# Patient Record
Sex: FEMALE | Race: WHITE | NOT HISPANIC OR LATINO | Employment: OTHER | URBAN - METROPOLITAN AREA
[De-identification: names, ages, dates, MRNs, and addresses within clinical notes are randomized per-mention and may not be internally consistent; named-entity substitution may affect disease eponyms.]

---

## 2023-05-19 LAB
CALCIDIOL (25 OH VITAMIN D3) (NG/ML) IN SER/PLAS: 25 NG/ML
COBALAMIN (VITAMIN B12) (PG/ML) IN SER/PLAS: 329 PG/ML (ref 211–911)
FERRITIN (UG/LL) IN SER/PLAS: 219 UG/L (ref 8–150)

## 2023-08-25 LAB
FERRITIN (UG/LL) IN SER/PLAS: 232 UG/L (ref 8–150)
IRON (UG/DL) IN SER/PLAS: 83 UG/DL (ref 35–150)
IRON BINDING CAPACITY (UG/DL) IN SER/PLAS: 316 UG/DL (ref 240–445)
IRON SATURATION (%) IN SER/PLAS: 26 % (ref 25–45)

## 2023-10-04 ENCOUNTER — TREATMENT (OUTPATIENT)
Dept: PHYSICAL THERAPY | Facility: CLINIC | Age: 73
End: 2023-10-04
Payer: MEDICARE

## 2023-10-04 DIAGNOSIS — M25.69 BACK STIFFNESS: ICD-10-CM

## 2023-10-04 DIAGNOSIS — M54.9 CHRONIC LEFT-SIDED BACK PAIN, UNSPECIFIED BACK LOCATION: Primary | ICD-10-CM

## 2023-10-04 DIAGNOSIS — G89.29 CHRONIC LEFT-SIDED BACK PAIN, UNSPECIFIED BACK LOCATION: Primary | ICD-10-CM

## 2023-10-04 PROCEDURE — 97110 THERAPEUTIC EXERCISES: CPT | Mod: GP | Performed by: PHYSICAL THERAPIST

## 2023-10-04 NOTE — PROGRESS NOTES
Physical Therapy Treatment    Patient Name: Ludmila Bermudez  MRN: [id number]  Today's Date: 10/4/2023  Time Calculation  Start Time: 0125  Stop Time: 0225  Time Calculation (min): 60 min    Current Problem  1. Chronic left-sided back pain, unspecified back location        2. Back stiffness            Insurance   Payer: Medicare Part B  Visit Number: 4  Approved Visits: mn  Date Range: cert period 9/12/23-12/12/23  Misc: na  Precautions   PMHx: CA, DVT, ulcer.       Subjective   Pt reports the home program is going well at home. She did have knee pain after practicing the squats at home from a low card chair, but not since she put a cushion on it. She had back pain once at a soccer game over the past week, but otherwise it has been feeling good.        Pain     0/10    Objective     Treatments:  all exercises completed below with verbal cues on technique and SBA  Access Code: [email or web address]  URL: [email or web address]  Date: 10/04/2023  Prepared by: Jinny Rowell    Exercises  - Seated Thoracic Lumbar Extension  - 3 x daily - 7 x weekly - 1 sets - 10-20 reps  - Standing Lumbar Extension with Counter  - 3 x daily - 7 x weekly - 1 sets - 10-20 reps  - Standing Shoulder Row with Anchored Resistance  - 1-2 x daily - 7 x weekly - 1 sets - 10-20 reps  - Shoulder extension with resistance - Neutral  - 1-2 x daily - 7 x weekly - 1 sets - 10-20 reps  - Standing Anti-Rotation Press with Anchored Resistance  - 1-2 x daily - 7 x weekly - 1 sets - 10-20 reps  - Supine Bridge  - 1-2 x daily - 7 x weekly - 1 sets - 10-20 reps - 3-5s hold  - Figure 4 Bridge  - 2 x daily - 7 x weekly - 2 sets - 10-20 reps  - Clamshell with Resistance  - 2 x daily - 7 x weekly - 2 sets - 10-20 reps  - Single Leg Stance with Support  - 2 x daily - 7 x weekly - 2 sets  - Standing March with Unilateral Counter Support  - 2 x daily - 7 x weekly - 2 sets - 10-20 reps  - Squat with Chair Touch  - 2 x daily - 7 x weekly - 2 sets - 10-20  reps  - Quadruped Alternating Leg Extensions  - 2 x daily - 7 x weekly - 2 sets - 10-20 reps  - Bird Dog  - 2 x daily - 7 x weekly - 2 sets - 10-20 reps  - Forward Step Up  - 2 x daily - 7 x weekly - 4 sets - 5 reps    Assessment:     Pt able to progress strength based exercises this date     Plan:     Reassess NV and consider discharge to [facility]    Goals:

## 2023-10-05 ENCOUNTER — APPOINTMENT (OUTPATIENT)
Dept: PHYSICAL THERAPY | Facility: CLINIC | Age: 73
End: 2023-10-05
Payer: MEDICARE

## 2023-10-10 PROBLEM — R22.1 NECK MASS: Status: ACTIVE | Noted: 2023-10-10

## 2023-10-10 PROBLEM — R79.89 OTHER SPECIFIED ABNORMAL FINDINGS OF BLOOD CHEMISTRY: Status: RESOLVED | Noted: 2023-10-10 | Resolved: 2023-10-10

## 2023-10-10 PROBLEM — K25.9 GASTRIC ULCER: Status: ACTIVE | Noted: 2023-10-10

## 2023-10-10 PROBLEM — M25.69 BACK STIFFNESS: Status: RESOLVED | Noted: 2023-10-04 | Resolved: 2023-10-10

## 2023-10-10 PROBLEM — L30.9 ECZEMA: Status: ACTIVE | Noted: 2023-10-10

## 2023-10-10 PROBLEM — E87.1 HYPONATREMIA: Status: ACTIVE | Noted: 2023-10-10

## 2023-10-10 PROBLEM — E55.9 VITAMIN D DEFICIENCY: Status: ACTIVE | Noted: 2023-10-10

## 2023-10-10 PROBLEM — E53.8 VITAMIN B12 DEFICIENCY: Status: ACTIVE | Noted: 2023-10-10

## 2023-10-10 PROBLEM — K92.1 HEMATOCHEZIA: Status: ACTIVE | Noted: 2023-10-10

## 2023-10-10 PROBLEM — G47.9 SLEEP DISTURBANCE: Status: ACTIVE | Noted: 2023-10-10

## 2023-10-10 PROBLEM — R79.89 OTHER SPECIFIED ABNORMAL FINDINGS OF BLOOD CHEMISTRY: Status: ACTIVE | Noted: 2023-10-10

## 2023-10-10 PROBLEM — K63.3 SIGMOID COLON ULCER: Status: ACTIVE | Noted: 2023-10-10

## 2023-10-10 PROBLEM — K11.7 XEROSTOMIA DUE TO RADIOTHERAPY: Status: ACTIVE | Noted: 2023-10-10

## 2023-10-10 PROBLEM — I82.629 DEEP VEIN THROMBOSIS (DVT) OF UPPER EXTREMITY (MULTI): Status: ACTIVE | Noted: 2023-10-10

## 2023-10-10 PROBLEM — C77.0 METASTASIS TO HEAD AND NECK LYMPH NODE (MULTI): Status: ACTIVE | Noted: 2023-10-10

## 2023-10-10 PROBLEM — H61.22 IMPACTED CERUMEN OF LEFT EAR: Status: ACTIVE | Noted: 2023-10-10

## 2023-10-10 PROBLEM — R13.12 DYSPHAGIA, OROPHARYNGEAL PHASE: Status: ACTIVE | Noted: 2023-10-10

## 2023-10-10 PROBLEM — C09.9 MALIGNANT NEOPLASM OF TONSIL (MULTI): Status: ACTIVE | Noted: 2023-10-10

## 2023-10-10 PROBLEM — R79.89 ELEVATED FERRITIN: Status: ACTIVE | Noted: 2023-10-10

## 2023-10-10 PROBLEM — L02.11 CELLULITIS AND ABSCESS OF NECK: Status: ACTIVE | Noted: 2023-10-10

## 2023-10-10 PROBLEM — R06.00 DYSPNEA: Status: ACTIVE | Noted: 2023-10-10

## 2023-10-10 PROBLEM — G57.93 NEUROPATHY OF BOTH FEET: Status: ACTIVE | Noted: 2023-10-10

## 2023-10-10 PROBLEM — E83.42 HYPOMAGNESEMIA: Status: ACTIVE | Noted: 2023-10-10
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
## 2023-10-10 PROBLEM — L03.221 CELLULITIS AND ABSCESS OF NECK: Status: ACTIVE | Noted: 2023-10-10

## 2023-10-10 RX ORDER — ATORVASTATIN CALCIUM TRIHYDRATE 20 MG/1
1 TABLET, FILM COATED ORAL NIGHTLY
COMMUNITY
Start: 2021-11-18

## 2023-10-10 RX ORDER — ACETAMINOPHEN 500 MG
TABLET ORAL DAILY
COMMUNITY

## 2023-10-11 ENCOUNTER — TREATMENT (OUTPATIENT)
Dept: PHYSICAL THERAPY | Facility: CLINIC | Age: 73
End: 2023-10-11
Payer: MEDICARE

## 2023-10-11 DIAGNOSIS — G89.29 CHRONIC BACK PAIN, UNSPECIFIED BACK LOCATION, UNSPECIFIED BACK PAIN LATERALITY: Primary | ICD-10-CM

## 2023-10-11 DIAGNOSIS — M54.9 CHRONIC BACK PAIN, UNSPECIFIED BACK LOCATION, UNSPECIFIED BACK PAIN LATERALITY: Primary | ICD-10-CM

## 2023-10-11 PROCEDURE — 97110 THERAPEUTIC EXERCISES: CPT | Mod: GP | Performed by: PHYSICAL THERAPIST

## 2023-10-11 ASSESSMENT — ENCOUNTER SYMPTOMS
OCCASIONAL FEELINGS OF UNSTEADINESS: 0
LOSS OF SENSATION IN FEET: 1
DEPRESSION: 0

## 2023-10-11 NOTE — PROGRESS NOTES
Physical Therapy Treatment-- Discharge Summary    Patient Name: Ludmila Bermudez  MRN: [id number]  Today's Date: 10/11/2023  Time Calculation  Start Time: 0130  Stop Time: 0212  Time Calculation (min): 42 min    Current Problem  1. Chronic back pain, unspecified back location, unspecified back pain laterality              Insurance   Payer: Medicare Part B  Visit Number: 5  Approved Visits: mn  Date Range: cert period 9/12/23-12/12/23  Misc: na  Precautions   PMHx: CA, DVT, ulcer.       Subjective   Pt reports her back still comes and goes depending on what she is doing. She has not been sleeping well recently which she thinks contributes to her back pain.  She reports she is able to stand and walk longer distances before her back starts to bother her to where she needs to sit.  Pt also reports she is only taking tylenol a few times per week.      Pain   Current pain 0/10  Worst pain 2/10      Objective   5x sit to stand test: 9s  30s sit to stand test: 16.5  SLS: 46s RLE 19s LLE        Treatments:  Bike warm up x 1 mile @ L 2.5  5x sit to stand  30s sit to stand  SLS R/L LE with supervision  Lumbar ROM all planes  Review HEP   all exercises completed below with verbal cues on technique and SBA  Access Code: [email or web address]  URL: [email or web address]  Date: 10/04/2023  Prepared by: Jinny Rowell    Exercises  - Seated Thoracic Lumbar Extension  - 3 x daily - 7 x weekly - 1 sets - 10-20 reps  - Standing Lumbar Extension with Counter  - 3 x daily - 7 x weekly - 1 sets - 10-20 reps  - Standing Shoulder Row with Anchored Resistance  - 1-2 x daily - 7 x weekly - 1 sets - 10-20 reps  - Shoulder extension with resistance - Neutral  - 1-2 x daily - 7 x weekly - 1 sets - 10-20 reps  - Standing Anti-Rotation Press with Anchored Resistance  - 1-2 x daily - 7 x weekly - 1 sets - 10-20 reps  - Supine Bridge  - 1-2 x daily - 7 x weekly - 1 sets - 10-20 reps - 3-5s hold  - Figure 4 Bridge  - 2 x daily - 7 x weekly  - 2 sets - 10-20 reps  - Clamshell with Resistance  - 2 x daily - 7 x weekly - 2 sets - 10-20 reps  - Single Leg Stance with Support  - 2 x daily - 7 x weekly - 2 sets  - Standing March with Unilateral Counter Support  - 2 x daily - 7 x weekly - 2 sets - 10-20 reps  - Squat with Chair Touch  - 2 x daily - 7 x weekly - 2 sets - 10-20 reps  - Quadruped Alternating Leg Extensions  - 2 x daily - 7 x weekly - 2 sets - 10-20 reps  - Bird Dog  - 2 x daily - 7 x weekly - 2 sets - 10-20 reps  - Forward Step Up  - 2 x daily - 7 x weekly - 4 sets - 5 reps    Assessment:     Pt demonstrating improvements in pain, ROM, strength, balance, endurance, and function since start of PT. She is expected to maintain improvements with continued adherence to HEP.      Plan:   Discharge to HEP.    Goals:    Pt will independently comply to HEP without difficulty. -met   Pt will improve score on Oswestry by 12 points to meet MCID. -met  Pt will demonstrate symmetrical ROM without pain or tightness. -met  Pt will stand without c/o fatigue or symptoms to complete chores in kitchen without discomfort for 60 minutes. -met  Pt will amb community distances without increased pain.  -met

## 2023-10-12 ENCOUNTER — APPOINTMENT (OUTPATIENT)
Dept: PHYSICAL THERAPY | Facility: CLINIC | Age: 73
End: 2023-10-12
Payer: MEDICARE

## 2023-10-24 ENCOUNTER — ALLIED HEALTH (OUTPATIENT)
Dept: INTEGRATIVE MEDICINE | Facility: CLINIC | Age: 73
End: 2023-10-24
Payer: MEDICARE

## 2023-10-24 DIAGNOSIS — G89.29 CHRONIC PAIN: ICD-10-CM

## 2023-10-24 DIAGNOSIS — M54.59 OTHER LOW BACK PAIN: Primary | ICD-10-CM

## 2023-10-24 PROCEDURE — 97810 ACUP 1/> WO ESTIM 1ST 15 MIN: CPT | Performed by: ACUPUNCTURIST

## 2023-10-24 PROCEDURE — 97811 ACUP 1/> W/O ESTIM EA ADD 15: CPT | Performed by: ACUPUNCTURIST

## 2023-10-24 ASSESSMENT — ENCOUNTER SYMPTOMS
NECK PAIN: 1
BACK PAIN: 1

## 2023-10-24 NOTE — PROGRESS NOTES
Acupuncture Visit:     Subjective   Patient ID: Ludmila Bermudez is a 73 y.o. female who presents for Back Pain and Neck Pain  Pt reported that she graduated from physical therapy.  Her balance speed agility have improved.  Her back is reported as doing well with occasional fatigue from doing too much.  She and  just returned from trip to [place].  She was able to enjoy with no issue.  Her neck is also good with 1-2 catches since last treatment.  She continues to have neuropathy in her feet    Back Pain    Neck Pain         Session Information  Is this acupuncture treatment being billed to the patient's insurance company: Yes  This is visit number: 3  The patient has a total number of visits of: 8  Name of Insurance Company: Medicare a/b  Name of Supervising Physician: MATHEW Lundberg  Visit Type: Follow-up visit         Review of Systems   Musculoskeletal:  Positive for back pain and neck pain.            Provider reviewed plan for the acupuncture session, precautions and contraindications. Patient/guardian/hospital staff has given consent to treat with full understanding of what to expect during the session. Before acupuncture began, provider explained to the patient to communicate at any time if the procedure was causing discomfort past their tolerance level. Patient agreed to advise acupuncturist. The acupuncturist counseled the patient on the risks of acupuncture treatment including pain, infection, bleeding, and no relief of pain. The patient was positioned comfortably. There was no evidence of infection at the site of needle insertions.    Objective   Physical Exam         Treatment Plan  Treatment Goals: Pain management  Pattern Differentiation: st qi deficiency adrenal imbalance, immune imbalance  Treatment Principle: move qi and blood, regulate adrenals and immune    Acupuncture Treatment  Patient Position: Supine on a table  Acupuncture Needling: Yes  Needle Guage: 36 guage /.20/ Blue sindy, 40 guage  /.16/ Red seirin, 42 guage /.14/ Lime green seirin  Body Points: With retention  Body Points - Left: sj 5, east wind, si 16, gb 21 12  Body Points - Bilateral: baxie, st qix3, k3, sp 3.2  Body Points - Right: gb 41  Other Techniques Utilized: TDP Lamp, Topicals  Topicals Description: warming lotion orange frankinscense  TDP Lamp Descripton: gary 15 min  Needle Count In: 24  Needle Count Out: 24  Needle Retention Time (min): 20 minutes  Total Face to Face Time (min): 25 minutes              Assessment/Plan

## 2023-11-14 ENCOUNTER — ALLIED HEALTH (OUTPATIENT)
Dept: INTEGRATIVE MEDICINE | Facility: CLINIC | Age: 73
End: 2023-11-14
Payer: MEDICARE

## 2023-11-14 DIAGNOSIS — M54.59 OTHER LOW BACK PAIN: Primary | ICD-10-CM

## 2023-11-14 DIAGNOSIS — G89.29 CHRONIC PAIN: ICD-10-CM

## 2023-11-14 PROCEDURE — 97810 ACUP 1/> WO ESTIM 1ST 15 MIN: CPT | Performed by: INTERNAL MEDICINE

## 2023-11-14 PROCEDURE — 97811 ACUP 1/> W/O ESTIM EA ADD 15: CPT | Performed by: INTERNAL MEDICINE

## 2023-11-14 NOTE — PROGRESS NOTES
"Acupuncture Visit:     Subjective   Patient ID: Ludmila Bermudez is a 73 y.o. female who presents for No chief complaint on file.  Pt reported that her back continues to be pain free. She is able to travel and navigate any issues on her own.  Her neck also continues to be doing well.  She has only had the \"catching feeling\"  1x.  Neuropathy is maintaining and she has intermittent pain in her ankle.        Session Information  Is this acupuncture treatment being billed to the patient's insurance company: Yes  This is visit number: 4  The patient has a total number of visits of: 8  Name of Insurance Company: Medicare a/b  Name of Supervising Physician: MATHEW Lundberg  Visit Type: Follow-up visit         Review of Systems         Provider reviewed plan for the acupuncture session, precautions and contraindications. Patient/guardian/hospital staff has given consent to treat with full understanding of what to expect during the session. Before acupuncture began, provider explained to the patient to communicate at any time if the procedure was causing discomfort past their tolerance level. Patient agreed to advise acupuncturist. The acupuncturist counseled the patient on the risks of acupuncture treatment including pain, infection, bleeding, and no relief of pain. The patient was positioned comfortably. There was no evidence of infection at the site of needle insertions.    Objective   Physical Exam         Treatment Plan  Treatment Goals: Pain management    Acupuncture Treatment  Patient Position: Supine on a table  Needle Guage: 42 guage /.14/ Lime green seirin, 40 guage /.16/ Red seirin, 36 guage /.20/ Blue seirin  Body Points: With retention  Body Points - Left: sj 16, gb 12, 20, si 16, gb 21  Body Points - Bilateral: st qix1, k 7, 10, bafeng, sp 3.2  Other Techniques Utilized: TDP Lamp, Topicals  Topicals Description: warming lotion orange frankinscense  TDP Lamp Descripton: gary 15 min  Needle Count In: 21  Needle Count " Out: 21  Needle Retention Time (min): 20 minutes  Total Face to Face Time (min): 25 minutes              Assessment/Plan

## 2023-12-01 NOTE — PROGRESS NOTES
Patient ID: Ludmila Bermudez is a 73 y.o. female.  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 Sabino Ragsdale  MedOnc: Dr. Kyunghee Burkitt, X, Tsah Gonzalez ([place])  RadOnc: Dr. Akhil Raphael    Prior Therapy  6/8 - 7/27/21 Concurrent chemoradiation w/ 7 doses of Cisplatin (40mg/m2),  VMAT 70 gy in 35 fx     ONCOLOGIC HISTORY   - 2/2021: Started to have bilateral ear aches. She was treated and eventually ended up with ear aches only on the left side. Her throat continues to bother her, eventually developed a left-sided neck mass. Further evaluation showed a left tonsillar lesion.  - 4/30/2021: CT Neck showed bilateral neck metastasis especially on the left side (~6 cm)  - 5/2021: Biopsy of left tonsillar lesion showed p16+ SCC  - 6/8 - 7/27/21: Concurrent chemoradiation with weekly cisplatin (chemo held on 7/7 due to low platelet, but received total 7 treatments of weekly cisplatin-made up chemo was given on the last date of radiation)  - 10/28/21 Post treatment PET/CT showed very good response, but possible residual metabolic activity in Left level  3 and 4 nodes  - 1/4/22 CT Neck w/ contrast to f/u Left level 3 and 4 nodes, showed subtle irregularity of a left level 2 lymph node raising suspicion for neoplastic involvement.   - 1/31/22 FNA of Level 2 lymph node was non-diagnostic d/t insufficient cellularity  - 3/7/22 Underwent Excisional biopsy of Left level 2 node with Dr. Ragsdale. Path showing Extensive necrosis with fibro-inflammatory and xanthomatous reaction. No definite viable tumor identified.       Treatment complicated by:  - 7/31/21 Admitted due to worsening mucositis and poor oral intake. Prolonged hospitalization due to thrombocytopenia (most  likely ITP) and hematochezia. Thrombocytopenia is likely infection related vs meds side effects. She received Dexa 40mg 4/4 (8/8 - 8/11), two sessions  of IVIG ( - ), IVIG 0.4 mg/kg daily with platelets to follow (- ), s/p 2 doses of Nplate (  and )    - 21: DVT on right axillary vein  - GI bleed: seen by GI, likely secondary to low platelet count.  Per GI, low concern for UGIB.  - 21 Colonoscopy: biopsy of descending colon polyp showed tubular adenoma  -  - 21: Readmitted due to DVT in  LLE.  Started on Lovenox, but stopped due to platelets dropped. Work up for heparin induced thrombocytopenia is negative.  Started on Xarelto on 9/10/21 evening with close follow up with hematology.  - 21: Platelet 61. Taking Xarelto 15mg BID. Per hematology, decrease Xarelto to 15mg daily if platelet is below     PmHx: hx of left tonsil SCC, anxiety, depression, HLD  PsHx: radial head implant ()  FmHx: mother (colorectal cancer at age 67,  at age 94)  ScHx: lives with family, non-smoker, occasional alcohol use, retired  Allergies: Penicillins    Past Medical History:   No past medical history on file.   Surgical History:    Past Surgical History:   Procedure Laterality Date    OTHER SURGICAL HISTORY  2021    Elbow fracture repair      Family History:    Family History   Problem Relation Name Age of Onset    Rectal cancer Mother      Other (appendix disease) Father       Family Oncology History:    Cancer-related family history includes Rectal cancer in her mother.  Social History:            Subjective   Chief Complaint: Squamous Cell Carcinoma of left tonsil, follow up    HPI  Interval History  Ludmila Bermudez is a 73 y.o. female with h/o p16+ SCC of left tonsil with bilateral neck disease s/p concurrent chemoradiation   - 21 with 7 doses of weekly cisplatin, 70gy RT.       Patient presents today accompanied by her  for 2 year 4  months post treatment follow up, reports the following:     She is feeling well over all.   She has mild chronic dysphagia caused by dry mouth.   She has some tightness in her neck.    She is not sleeping well due to achiness and dry mouth at night.  She went to PT in June for back pain as she has multilevel degenerative change in the L spine.   She has sensation of pins & needles in the left foot that is bothersome to her. Only taking Gabapentin 100mg at bedtime.  She's having acupuncture (Xena Duncan) for saliva, neck stiff, back pain, and neuropathy in her feet. She's seeing nice results from acupuncture.   Her left ear fullness resolved with acupuncture as well.   She's following a dentist Q6 mo.  Patient asked about elevated ferritin on prior labs with regards to hemochromatosis. Given her post menopausal status and ferritin <300, and no s/s that would indicate hemochromatosis like fatigue, generalized pain.       ROS  Review of Systems   Constitutional:  Negative for chills and fever.   HENT:   Negative for hearing loss, lump/mass, mouth sores, nosebleeds, tinnitus and trouble swallowing.         Dry mouth   Respiratory:  Negative for cough and shortness of breath.    Cardiovascular:  Negative for chest pain and leg swelling.   Gastrointestinal:  Negative for abdominal pain, constipation, diarrhea, nausea and vomiting.   Musculoskeletal:  Positive for back pain and neck stiffness.   Skin:  Negative for rash.   Neurological:  Negative for headaches, light-headedness and numbness.        Left foot neuropathy       Allergies  Allergies   Allergen Reactions    Sulfamethoxazole Unknown    Penicillins Rash        Medications  Current Outpatient Medications   Medication Instructions    acetaminophen (TYLENOL ORAL) oral    cholecalciferol (Vitamin D3) 5,000 Units tablet oral, Daily    cyanocobalamin, vitamin B-12, (VITAMIN B-12 ORAL) 1,000 mcg, oral    Lipitor 20 mg tablet 1 tablet, oral, Nightly    vit C/E/Zn/coppr/lutein/zeaxan (PRESERVISION AREDS-2 ORAL) oral, 2 times daily          Objective   VS: There were no vitals taken for this visit.  Weight: Daily Weight  05/19/23 : 76.9 kg (169  lb 7 oz)  02/17/23 : 74.4 kg (164 lb)  02/16/23 : 74.6 kg (164 lb 7.4 oz)  11/15/22 : 75.3 kg (166 lb)  08/16/22 : 74.6 kg (164 lb 6 oz)  08/16/22 : 74.5 kg (164 lb 3.9 oz)  05/17/22 : 74 kg (163 lb 3.2 oz)      Physical Exam  Constitutional:       Appearance: Normal appearance. She is well-developed.   HENT:      Head: Normocephalic and atraumatic.      Right Ear: External ear normal. No tenderness.      Left Ear: External ear normal. No tenderness.      Nose: Nose normal.      Mouth/Throat:      Mouth: No injury or oral lesions.      Tongue: No lesions.   Eyes:      Extraocular Movements: Extraocular movements intact.      Conjunctiva/sclera: Conjunctivae normal.      Pupils: Pupils are equal, round, and reactive to light.   Neck:      Thyroid: No thyroid mass.   Cardiovascular:      Rate and Rhythm: Normal rate and regular rhythm.      Pulses: Normal pulses.      Heart sounds: No murmur heard.     No gallop.   Pulmonary:      Effort: Pulmonary effort is normal. No respiratory distress.      Breath sounds: Normal breath sounds. No stridor. No wheezing.   Abdominal:      General: Bowel sounds are normal. There is no distension or abdominal bruit.      Palpations: Abdomen is soft.      Tenderness: There is no abdominal tenderness.   Musculoskeletal:         General: Normal range of motion.      Cervical back: Normal range of motion and neck supple. No signs of trauma. Normal range of motion.   Lymphadenopathy:      Upper Body:      Right upper body: No axillary adenopathy.      Left upper body: No axillary adenopathy.   Skin:     General: Skin is warm and dry.      Comments: No new skin lesions   Neurological:      General: No focal deficit present.      Mental Status: She is alert and oriented to person, place, and time.      Gait: Gait is intact.   Psychiatric:         Mood and Affect: Mood and affect normal.         Behavior: Behavior is cooperative.         Diagnostic Results   Labs  Below labs are reviewed  "today.                         Images       Pathology  Lab Results   Component Value Date    PATHREP  03/07/2022     Name OK GREENWOOD                                                                                                   Accession #: [id number]            Pathologist:                   WADAD S. MNEIMNEH, MD  Date of Procedure:    3/7/2022  Date Received:          3/7/2022  Date Reported           3/8/2022  Submitting Physician:   FARNAZ FINK MD  Location:                    OR  Other External #                                                                    FINAL DIAGNOSIS  \"LEFT LEVEL 2 NODE\", RESECTION:  -- EXTENSIVE NECROSIS WITH FIBROINFLAMMATORY AND XANTHOMATOUS REACTION (SEE  NOTE).  -- NO DEFINITE VIABLE TUMOR IDENTIFIED.    Note: The tissue is entirely submitted for examination. The nodule consists of  necrosis with fibroinflammatory and xanthomatous reaction and cholesterol  clefts. Within the necrosis, there is occasional markedly degenerated  keratinous material. These findings most likely represent a previously replaced  lymph node by neoplasm with extensive/complete treatment response.  No viable  tumor is identified.                                                                                                                                                                                                                                                                                                                                                                                                                                                                                       Electronically Signed Out By WADAD S. MNEIMNEH, MD/WSM  By the signature on this report, the individual or group listed as making the  Final Interpretation/Diagnosis certifies that they have reviewed this case.         Intraoperative Consultation:  A: LEFT LEVEL 2 NODE ( RULE OUT CANCER)    Frozen " Section 1:  Date Ordered: 3/7/2022 13:04     Date Received: 3/7/2022 13:04     Date Called:  3/7/2022 13:27    Intraoperative Diagnosis:  A.  Negative for malignancy.  B.  Necrotizing granuloma.  Intraoperative Consult Pathologist(s):  HIREN REESE MD (P)      aaa/3/7/2022           Clinical History:  tonsillar cancer, status post chemoradiation      Specimens Submitted As:  A: LEFT LEVEL 2 NODE ( RULE OUT CANCER)     Gross Description:  Received fresh for intraoperative consultation, labeled with the patient's name  and hospital number, is a 2.5 x 1.5 x 0.8 cm possible lymph node with attached  adipose tissue. The lymph node is serially sectioned. The entire lymph node was  submitted for intraoperative diagnosis and the tissue remnants in 2 cassettes,  A1 FSC-A2 FSC. The  remaining fibroadipose tissue is submitted for permanent in  one cassette, A3. AAA/RL    aaa/3/7/2022               [facility]  Department of Pathology   [address] [place] [address]        PATHREP  01/31/2022                                                     MRN: [id number]  Patient Name OK GREENWOOD  Accession #: [id number]  Date of Procedure:  1/31/2022       Date Reported: 2/2/2022  Date Received:  1/31/2022  Date of Birth / Sex 1950 (Age: 71) / F  Race: WHITE  Submitting Physician: FARNAZ FINK MD  Attending Physician: NABIL SINGH MD           Other External #          FINAL CYTOLOGICAL INTERPRETATION    A.  FINE NEEDLE ASPIRATION LEFT NECK LYMPH NODE, CYTOLOGY AND CELL BLOCK:       NONDIAGNOSTIC SPECIMEN DUE TO INSUFFICIENT CELLULARITY.        Interpretation performed at:  [facility]  Department of Pathology  [address] [place] [address]  Phone: [phone] Fax: [phone]      Slide(s) initially screened by a Cytotechnologist at [facility], [address] [place] [address]  [id number]      Electronically Signed Out By COLTON VASQUEZ MD    By the signature on this report, the individual or group listed as making the  Final Interpretation/Diagnosis certifies that they have reviewed this case.  Slide(s) initially screened by a Cytotechnologist at [facility]     Clinical History      Physician Contact Number: [phone]  Clinical Diagnosis History ENLARGED LYMPH NODE   Source of Specimen  A: FINE NEEDLE ASPIRATION LEFT NECK LYMPH NODE     Specimen Submitted as:  A:  FINE NEEDLE ASPIRATION LEFT NECK LYMPH NODE        Pap stain Non-Gyn, Pap stain Non-Gyn, Pap stain Non-Gyn, Pap stain  Non-Gyn, Diff-Quik stain Non-Gyn, Diff-Quik stain Non-Gyn, Diff-Quik stain  Non-Gyn, Diff-Quik stain Non-Gyn, CELL BLOCK, H&E, Initial    Gross Description  A.  FINE NEEDLE ASPIRATION LEFT NECK LYMPH NODE:  RECEIVED 8 DIRECT SMEARS (4  AIR-DRIED AND 4 ALCOHOL-FIXED) AND 30 cc OF PINK TINTED, CLEAR NEEDLE RINSE IN  CYTOLYT WITH FEW PARTICLES.                 [facility]  Department of Pathology  [address] [place] [address]             Assessment/Plan   ASSESSMENT  Ludmila Bermudez is a 73 y.o. female with hx of p16+ SCC of left tonsil with bilateral neck disease s/p concurrent chemoradiation with weekly cispl atin  6/8/ - 7/27/21, received 7 cycles of Cisplatin, 70 gy RT. Restaging PET/CT 10/28/21 showed very good response with possible residual metabolic activity in Level 3 & 4 nodes. Excisional biopsy 3/7/22 did not review evidence of metastatic disease.  --------------------  Today VSS, labs WNL, TSH wnl. Weight stable     # Left Tonsil SCC,  p16+  - 10/28/21 Post treatment PET/CT showed very good response with possible residual metabolic activity in Level 3 & 4 nodes.  - CT Neck 1/4/21 showed suspicious left cervical lymph node at level 2. FNA of left level 2 lymph node was biopsied on 1/31/22 however not enough tissues were obtained. Open biopsy on  3/7/22 with Dr. Ragsdale showed no malignancy.  - Had covid in January 2023 but is largely resolved, has a mild lingering cough.   - Patient is 2 year 4 month from last treatment, doing well overall. 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 Advise patient try Biotene gel and xylimelt for dry mouth atnight.      # Low Back Pain  - 1.5 h/o LBP, onset after long road trips. Back pain occurs with muscle spasm and radiation to muscle of lumbar region. Finds some relief when leaning forward on grocery carts  - Could be arthritis vs spinal stenosis, given h/o cancer could also be mets.   - Patient scheduled to start PT on 6/1  - MRI Lumbar/Sacrum did not show any neoplasm related change, though does note narrowing of multilevel degenerative changes of the lumbar spine.  - Patient      # Left side of tongue pain: improved  - Lesion noted on left side of tongue, patient has had pain there since starting CRT. Previously thought it was d/t irritation from adjacent silver cavity filling, but pain persists after filling was  changed to plastic. Likely SE from radiation      # Xerostomia/dysgeusia  - Improving with acupuncture notes saliva production returning, seeing acupuncturist Q2wks     # Eczema    - Red scaly rash on low back, belly, nape of neck, saw Derm, dx with Eczema and was prescribed topical steroids. Pt is not always compliant with this and she scratches at the rash. Advised patient to  follow derm recs     # Neuropathy, left foot  - Patient with ongoing left foot neuropathy causing her to wake up at night. Only taking Gabapentin 100mg at bedtime, which likely is not effective.   - Increase to 300mg at bedtime.    # Elevated Ferritin  - No family history of hemachromatosis. She is post menopausal and ferritin is <300, TSAT 26% (NOT >45%). Pt without s/s that would indicate hemochromatosis I.e fatigue, generalized pain. Liver enzymes WNL, no s/s of iron overload. As Ferritin is elevated but TSAT is normal, more  likely the elevated ferritin is due to chronic inflammatory state.       PLAN  -- RTC in 4 mo with ENT/MedOnc on 4/26/24  -- START Gabapentin 300mg at bedtime for left foot neuropathy  -- Can try biotene oral gel and Xylimelt for Xerostomia   -- Continue follow up with PCP/dentist/dermatologist/ PCP/ Integrative health/ Acupuncture

## 2023-12-06 ENCOUNTER — TELEMEDICINE (OUTPATIENT)
Dept: INTEGRATIVE MEDICINE | Facility: CLINIC | Age: 73
End: 2023-12-06
Payer: MEDICARE

## 2023-12-06 DIAGNOSIS — K11.7 XEROSTOMIA DUE TO RADIOTHERAPY: ICD-10-CM

## 2023-12-06 DIAGNOSIS — M54.59 OTHER LOW BACK PAIN: ICD-10-CM

## 2023-12-06 DIAGNOSIS — G47.9 SLEEP DISTURBANCE: ICD-10-CM
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
## 2023-12-06 DIAGNOSIS — E53.8 VITAMIN B12 DEFICIENCY: Primary | ICD-10-CM

## 2023-12-06 PROCEDURE — 99215 OFFICE O/P EST HI 40 MIN: CPT | Performed by: INTERNAL MEDICINE

## 2023-12-06 ASSESSMENT — ENCOUNTER SYMPTOMS
BACK PAIN: 1
FATIGUE: 1
SLEEP DISTURBANCE: 1

## 2023-12-06 NOTE — PROGRESS NOTES
Integrative Medicine Follow-up Visit :     Subjective   Patient ID: Ludmila Bermudez is a 73 y.o. female who presents for Dry Mouth   Used doximitry connection because patient could not figure out how to stay logged onto the connection through epic.     HPI  Went to PT and found that back pain has improved almost completely, 90% improved.  She feels PT helped her balance. She has continued the home exercises somewhat  Acupuncture- improved her back pain.    Vitamin b12 728 pg/ml  42.9 was vitamin d on September 13.  Has upcoming hematology appointment at end of December. She forgot to ask hematology about elevated ferritin.  Difficulty sleeping: she does not nap.  Cannot stay asleep.  Her mouth gets very dry when she sleeps. She has very dry mouth when she sleeps.        Pain: mostly with dry mouth.     Review of Systems   Constitutional:  Positive for fatigue.   HENT:          Dry mouth which is painful.    Musculoskeletal:  Positive for back pain.   Psychiatric/Behavioral:  Positive for sleep disturbance.                   Objective   There were no vitals taken for this visit.    Physical Exam  Constitutional:       General: She is not in acute distress.     Appearance: She is not ill-appearing, toxic-appearing or diaphoretic.   Pulmonary:      Effort: Pulmonary effort is normal. No respiratory distress.   Musculoskeletal:         General: No deformity.      Cervical back: Normal range of motion.      Comments: Upper extremities normal range of motion grossly   Skin:     Coloration: Skin is not jaundiced or pale.      Findings: No rash.   Psychiatric:         Mood and Affect: Mood normal.         Behavior: Behavior normal.                      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

## 2023-12-07 NOTE — ASSESSMENT & PLAN NOTE
Improved markedly. Continue acupuncture and encouraged patient to do the home PT exercises to keep her back limber and pain free.

## 2023-12-07 NOTE — ASSESSMENT & PLAN NOTE
Keep mouth closed during sleep to see if this helps- determine if she is mouth breather. Ask ENT about any other meds to try.

## 2023-12-07 NOTE — ASSESSMENT & PLAN NOTE
>>ASSESSMENT AND PLAN FOR OTHER LOW BACK PAIN WRITTEN ON 12/6/2023 11:17 PM BY GABINO WASSERMAN MD PHD    Improved markedly. Continue acupuncture and encouraged patient to do the home PT exercises to keep her back limber and pain free.

## 2023-12-14 ENCOUNTER — ALLIED HEALTH (OUTPATIENT)
Dept: INTEGRATIVE MEDICINE | Facility: CLINIC | Age: 73
End: 2023-12-14
Payer: MEDICARE

## 2023-12-14 DIAGNOSIS — R68.2 DRY MOUTH: ICD-10-CM

## 2023-12-14 DIAGNOSIS — M54.59 OTHER LOW BACK PAIN: Primary | ICD-10-CM

## 2023-12-14 DIAGNOSIS — G57.93 NEUROPATHY OF BOTH FEET: ICD-10-CM

## 2023-12-14 PROCEDURE — 97811 ACUP 1/> W/O ESTIM EA ADD 15: CPT | Performed by: INTERNAL MEDICINE

## 2023-12-14 PROCEDURE — 97810 ACUP 1/> WO ESTIM 1ST 15 MIN: CPT | Performed by: INTERNAL MEDICINE

## 2023-12-14 ASSESSMENT — ENCOUNTER SYMPTOMS: BACK PAIN: 1

## 2023-12-14 NOTE — PROGRESS NOTES
"Acupuncture Visit:     Subjective   Patient ID: Ludmila Bermudez is a 73 y.o. female who presents for Back Pain  Pt with .  She shared that her back has been good, with exception of 1 day when she felt it got tired.  It was a day that was rainy and cold with a lot of walking and traveling.  Her feet are ok.  She feels like her left foot and ankle are problematic.  She takes 1 gabapentin,   Previous:  Pt reported that her back continues to be pain free. She is able to travel and navigate any issues on her own.  Her neck also continues to be doing well.  She has only had the \"catching feeling\"  1x.  Neuropathy is maintaining and she has intermittent pain in her ankle and feels like she has a too tight shoe on.      Her dry mouth occurs at night.      Back Pain      Session Information  Is this acupuncture treatment being billed to the patient's insurance company: Yes  This is visit number: 5  The patient has a total number of visits of: 8  Name of Insurance Company: Medicare a/b  Name of Supervising Physician: MATHEW Lundberg  Visit Type: Follow-up visit         Review of Systems   Musculoskeletal:  Positive for back pain.            Provider reviewed plan for the acupuncture session, precautions and contraindications. Patient/guardian/hospital staff has given consent to treat with full understanding of what to expect during the session. Before acupuncture began, provider explained to the patient to communicate at any time if the procedure was causing discomfort past their tolerance level. Patient agreed to advise acupuncturist. The acupuncturist counseled the patient on the risks of acupuncture treatment including pain, infection, bleeding, and no relief of pain. The patient was positioned comfortably. There was no evidence of infection at the site of needle insertions.    Objective   Physical Exam         Treatment Plan  Treatment Goals: Pain management  Pattern Differentiation: st qi deficiency, oketsu adrenal " imbalance  Treatment Principle: move qi and blood, regulate adrenals    Acupuncture Treatment  Body Points - Left: cv 12, gb 40, ana luisa 5, lr 4  Body Points - Bilateral: st qix3, k 7, 10, 27,  Other Techniques Utilized: TDP Lamp, Topicals  Topicals Description: warming lotion orange frankinscense  TDP Lamp Descripton: gary 15 min  Needle Count In: 16  Needle Count Out: 16  Needle Retention Time (min): 25 minutes  Total Face to Face Time (min): 25 minutes              Assessment/Plan

## 2023-12-21 NOTE — PROGRESS NOTES
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 She received a combination of chemotherapy and radiation. I cannot appreciate any recurrent tumor.      Minimal dysphagia.    Impacted cerumen in the left ear which was addressed with a small instrument.     I will see her in 4 months.      Chief Complaint     Follow-up regarding the management of a left tonsillar cancer.      History of Present Illness    This lady was seen in May 2021 at the request of a local colleague. Back in February 2020 when she started complaining of bilateral earaches. She was treated and eventually ended up with earaches only on the left side. Her throat continues to bother her. She eventually developed a left-sided neck mass. This led to further evaluation which showed a left tonsillar lesion. This was biopsied and consistent with a p16 positive squamous cell carcinoma. She also had a CT scan of her neck that shows bilateral neck metastasis especially on the left side. She received a combination of chemotherapy and radiation. She finished on July 27, 2021. She really has had all sorts of issues during her treatments and ended up being in the hospital for 27 days. She had a PET scan done on October 28, 2021. I personally reviewed that scan that shows an uptake in the left a level 2. On January 4, 2022 she had a CT scan that I personally reviewed that shows there is a 1 cm node in the left level 2 that was described as suspicious by the radiologist. She was sent for an ultrasound-guided needle aspirate. Unfortunately this was not diagnostic. She was eventually brought to the operating room on March 7, 2022. The lymph node was removed. It did not show any cancer. She does have some limitation in swallowing because of dryness. She denies any pain or discomfort. She had a chest x-ray in May 2023 that was negative. She  had a TSH level in August 2023 which was also normal.     Physical Exam      Examination of the oral cavity and oropharynx shows evidence of dryness but no evidence of any suspicious lesions. There is good mobility of the tongue and palate. There is good mandibular excursion. Palpation of the parotid, neck, and thyroid field is negative for any masses or adenopathies.     A flexible laryngoscopy was carried out. Under topical Xylocaine and Moiz-Synephrine the scope was introduced through the nostril. The nasopharynx, base of tongue, hypopharynx, and larynx are visualized. The vocal cords are normally mobile. There is a fair amount of puffiness. There is some pooling of secretions.      The ear examination shows some impacted cerumen on the left.  This was addressed with small instruments.

## 2023-12-22 ENCOUNTER — OFFICE VISIT (OUTPATIENT)
Dept: OTOLARYNGOLOGY | Facility: HOSPITAL | Age: 73
End: 2023-12-22
Payer: MEDICARE

## 2023-12-22 ENCOUNTER — LAB (OUTPATIENT)
Dept: LAB | Facility: HOSPITAL | Age: 73
End: 2023-12-22
Payer: MEDICARE

## 2023-12-22 ENCOUNTER — OFFICE VISIT (OUTPATIENT)
Dept: HEMATOLOGY/ONCOLOGY | Facility: HOSPITAL | Age: 73
End: 2023-12-22
Payer: MEDICARE

## 2023-12-22 VITALS — BODY MASS INDEX: 29.19 KG/M2 | TEMPERATURE: 96.1 F | WEIGHT: 171 LBS | HEIGHT: 64 IN

## 2023-12-22 VITALS
HEIGHT: 63 IN | OXYGEN SATURATION: 96 % | WEIGHT: 170 LBS | HEART RATE: 74 BPM | BODY MASS INDEX: 30.12 KG/M2 | RESPIRATION RATE: 17 BRPM | TEMPERATURE: 96.6 F | SYSTOLIC BLOOD PRESSURE: 148 MMHG | DIASTOLIC BLOOD PRESSURE: 67 MMHG

## 2023-12-22 DIAGNOSIS — C10.9 MALIGNANT NEOPLASM OF OROPHARYNX (MULTI): ICD-10-CM

## 2023-12-22 DIAGNOSIS — E03.2 HYPOTHYROIDISM DUE TO MEDICATION: ICD-10-CM

## 2023-12-22 DIAGNOSIS — R13.12 DYSPHAGIA, OROPHARYNGEAL PHASE: Primary | ICD-10-CM

## 2023-12-22 DIAGNOSIS — C09.9 MALIGNANT NEOPLASM OF TONSIL (MULTI): Primary | ICD-10-CM

## 2023-12-22 DIAGNOSIS — C09.9 MALIGNANT NEOPLASM OF TONSIL (MULTI): ICD-10-CM

## 2023-12-22 DIAGNOSIS — K11.7 XEROSTOMIA DUE TO RADIOTHERAPY: ICD-10-CM

## 2023-12-22 DIAGNOSIS — G57.92 NEUROPATHY OF LEFT FOOT: ICD-10-CM

## 2023-12-22 DIAGNOSIS — Z08 ENCOUNTER FOR FOLLOW-UP SURVEILLANCE OF HEAD AND NECK CANCER: ICD-10-CM

## 2023-12-22 DIAGNOSIS — H61.22 IMPACTED CERUMEN OF LEFT EAR: ICD-10-CM
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
## 2023-12-22 DIAGNOSIS — Z85.89 ENCOUNTER FOR FOLLOW-UP SURVEILLANCE OF HEAD AND NECK CANCER: ICD-10-CM

## 2023-12-22 DIAGNOSIS — R79.89 ELEVATED FERRITIN: ICD-10-CM

## 2023-12-22 LAB
ALBUMIN SERPL BCP-MCNC: 4.3 G/DL (ref 3.4–5)
ALP SERPL-CCNC: 104 U/L (ref 33–136)
ALT SERPL W P-5'-P-CCNC: 18 U/L (ref 7–45)
ANION GAP SERPL CALC-SCNC: 12 MMOL/L (ref 10–20)
AST SERPL W P-5'-P-CCNC: 19 U/L (ref 9–39)
BASOPHILS # BLD AUTO: 0.02 X10*3/UL (ref 0–0.1)
BASOPHILS NFR BLD AUTO: 0.4 %
BILIRUB SERPL-MCNC: 0.5 MG/DL (ref 0–1.2)
BUN SERPL-MCNC: 19 MG/DL (ref 6–23)
CALCIUM SERPL-MCNC: 9.4 MG/DL (ref 8.6–10.3)
CHLORIDE SERPL-SCNC: 103 MMOL/L (ref 98–107)
CO2 SERPL-SCNC: 29 MMOL/L (ref 21–32)
CREAT SERPL-MCNC: 0.79 MG/DL (ref 0.5–1.05)
EOSINOPHIL # BLD AUTO: 0.08 X10*3/UL (ref 0–0.4)
EOSINOPHIL NFR BLD AUTO: 1.7 %
ERYTHROCYTE [DISTWIDTH] IN BLOOD BY AUTOMATED COUNT: 11.8 % (ref 11.5–14.5)
GFR SERPL CREATININE-BSD FRML MDRD: 80 ML/MIN/1.73M*2
GLUCOSE SERPL-MCNC: 102 MG/DL (ref 74–99)
HCT VFR BLD AUTO: 39.4 % (ref 36–46)
HGB BLD-MCNC: 12.8 G/DL (ref 12–16)
IMM GRANULOCYTES # BLD AUTO: 0.01 X10*3/UL (ref 0–0.5)
IMM GRANULOCYTES NFR BLD AUTO: 0.2 % (ref 0–0.9)
LYMPHOCYTES # BLD AUTO: 1.38 X10*3/UL (ref 0.8–3)
LYMPHOCYTES NFR BLD AUTO: 29.6 %
MCH RBC QN AUTO: 32.4 PG (ref 26–34)
MCHC RBC AUTO-ENTMCNC: 32.5 G/DL (ref 32–36)
MCV RBC AUTO: 100 FL (ref 80–100)
MONOCYTES # BLD AUTO: 0.58 X10*3/UL (ref 0.05–0.8)
MONOCYTES NFR BLD AUTO: 12.4 %
NEUTROPHILS # BLD AUTO: 2.59 X10*3/UL (ref 1.6–5.5)
NEUTROPHILS NFR BLD AUTO: 55.7 %
NRBC BLD-RTO: 0 /100 WBCS (ref 0–0)
PLATELET # BLD AUTO: 173 X10*3/UL (ref 150–450)
POTASSIUM SERPL-SCNC: 4.1 MMOL/L (ref 3.5–5.3)
PROT SERPL-MCNC: 6.8 G/DL (ref 6.4–8.2)
RBC # BLD AUTO: 3.95 X10*6/UL (ref 4–5.2)
SODIUM SERPL-SCNC: 140 MMOL/L (ref 136–145)
WBC # BLD AUTO: 4.7 X10*3/UL (ref 4.4–11.3)

## 2023-12-22 PROCEDURE — 85025 COMPLETE CBC W/AUTO DIFF WBC: CPT

## 2023-12-22 PROCEDURE — 69210 REMOVE IMPACTED EAR WAX UNI: CPT | Performed by: OTOLARYNGOLOGY

## 2023-12-22 PROCEDURE — 99213 OFFICE O/P EST LOW 20 MIN: CPT | Performed by: OTOLARYNGOLOGY

## 2023-12-22 PROCEDURE — 31575 DIAGNOSTIC LARYNGOSCOPY: CPT | Performed by: OTOLARYNGOLOGY

## 2023-12-22 PROCEDURE — 1036F TOBACCO NON-USER: CPT | Performed by: STUDENT IN AN ORGANIZED HEALTH CARE EDUCATION/TRAINING PROGRAM

## 2023-12-22 PROCEDURE — 1036F TOBACCO NON-USER: CPT | Performed by: OTOLARYNGOLOGY

## 2023-12-22 PROCEDURE — 99215 OFFICE O/P EST HI 40 MIN: CPT | Performed by: STUDENT IN AN ORGANIZED HEALTH CARE EDUCATION/TRAINING PROGRAM

## 2023-12-22 PROCEDURE — 1126F AMNT PAIN NOTED NONE PRSNT: CPT | Performed by: STUDENT IN AN ORGANIZED HEALTH CARE EDUCATION/TRAINING PROGRAM

## 2023-12-22 PROCEDURE — 1159F MED LIST DOCD IN RCRD: CPT | Performed by: OTOLARYNGOLOGY

## 2023-12-22 PROCEDURE — 1160F RVW MEDS BY RX/DR IN RCRD: CPT | Performed by: OTOLARYNGOLOGY

## 2023-12-22 PROCEDURE — 36415 COLL VENOUS BLD VENIPUNCTURE: CPT

## 2023-12-22 PROCEDURE — 1159F MED LIST DOCD IN RCRD: CPT | Performed by: STUDENT IN AN ORGANIZED HEALTH CARE EDUCATION/TRAINING PROGRAM

## 2023-12-22 PROCEDURE — 1160F RVW MEDS BY RX/DR IN RCRD: CPT | Performed by: STUDENT IN AN ORGANIZED HEALTH CARE EDUCATION/TRAINING PROGRAM

## 2023-12-22 PROCEDURE — 80053 COMPREHEN METABOLIC PANEL: CPT

## 2023-12-22 PROCEDURE — 1126F AMNT PAIN NOTED NONE PRSNT: CPT | Performed by: OTOLARYNGOLOGY

## 2023-12-22 ASSESSMENT — ENCOUNTER SYMPTOMS
CONSTIPATION: 0
ABDOMINAL PAIN: 0
FEVER: 0
TROUBLE SWALLOWING: 0
SHORTNESS OF BREATH: 0
DIARRHEA: 0
NECK STIFFNESS: 1
LEG SWELLING: 0
VOMITING: 0
CHILLS: 0
HEADACHES: 0
OCCASIONAL FEELINGS OF UNSTEADINESS: 0
DEPRESSION: 0
LOSS OF SENSATION IN FEET: 1
COUGH: 0
NAUSEA: 0
NUMBNESS: 0
LIGHT-HEADEDNESS: 0
BACK PAIN: 1

## 2023-12-22 ASSESSMENT — PATIENT HEALTH QUESTIONNAIRE - PHQ9
SUM OF ALL RESPONSES TO PHQ9 QUESTIONS 1 & 2: 0
2. FEELING DOWN, DEPRESSED OR HOPELESS: NOT AT ALL
1. LITTLE INTEREST OR PLEASURE IN DOING THINGS: NOT AT ALL

## 2023-12-22 ASSESSMENT — PAIN SCALES - GENERAL: PAINLEVEL: 0-NO PAIN

## 2024-01-25 ENCOUNTER — ALLIED HEALTH (OUTPATIENT)
Dept: INTEGRATIVE MEDICINE | Facility: CLINIC | Age: 74
End: 2024-01-25
Payer: MEDICARE

## 2024-01-25 DIAGNOSIS — M54.59 OTHER LOW BACK PAIN: Primary | ICD-10-CM

## 2024-01-25 PROCEDURE — 97811 ACUP 1/> W/O ESTIM EA ADD 15: CPT | Performed by: INTERNAL MEDICINE

## 2024-01-25 PROCEDURE — 97810 ACUP 1/> WO ESTIM 1ST 15 MIN: CPT | Performed by: INTERNAL MEDICINE

## 2024-01-25 ASSESSMENT — ENCOUNTER SYMPTOMS: BACK PAIN: 1

## 2024-01-25 NOTE — PROGRESS NOTES
Acupuncture Visit:     Subjective   Patient ID: Ludmila Bermudez is a 73 y.o. female who presents for Back Pain  Pt with .  She shared that she has been getting cramps in feet and lower legs.  It resolved by drinking pickle juice.  Her back has been pretty good.  Her neck has caught 1-2x only.  She is waking up less due to mouth dryness.      Previous:  Pt with .  She shared that her back has been good, with exception of 1 day when she felt it got tired.  It was a day that was rainy and cold with a lot of walking and traveling.  Her feet are ok.  She feels like her left foot and ankle are problematic.  She takes 1 gabapentin,     Back Pain        Session Information  Is this acupuncture treatment being billed to the patient's insurance company: Yes  This is visit number: 6  The patient has a total number of visits of: 8  Name of Insurance Company: Medicare a/b  Name of Supervising Physician: MATHEW Lundberg  Visit Type: Follow-up visit         Review of Systems   Musculoskeletal:  Positive for back pain.            Provider reviewed plan for the acupuncture session, precautions and contraindications. Patient/guardian/hospital staff has given consent to treat with full understanding of what to expect during the session. Before acupuncture began, provider explained to the patient to communicate at any time if the procedure was causing discomfort past their tolerance level. Patient agreed to advise acupuncturist. The acupuncturist counseled the patient on the risks of acupuncture treatment including pain, infection, bleeding, and no relief of pain. The patient was positioned comfortably. There was no evidence of infection at the site of needle insertions.    Objective   Physical Exam         Treatment Plan  Treatment Goals: Pain management  Pattern Differentiation: oketsu, sugar imbalance  Treatment Principle: regulate sugar, move qi and blood    Acupuncture Treatment  Patient Position: Supine on a  table  Acupuncture Needling: Yes  Needle Guage: 40 guage /.16/ Red seirin, 32 guage /.25/ Purple seirin  Body Points: With retention  Body Points - Left: gb 20, si 16  Body Points - Bilateral: lr3, li 4, st 25, cv 2.5 12  Other Techniques Utilized: TDP Lamp, Topicals  Topicals Description: warming lotion orange frankinscense  TDP Lamp Descripton: gary 15 min  Needle Count In: 12  Needle Count Out: 12  Needle Retention Time (min): 25 minutes  Total Face to Face Time (min): 25 minutes              Assessment/Plan

## 2024-03-07 ENCOUNTER — ALLIED HEALTH (OUTPATIENT)
Dept: INTEGRATIVE MEDICINE | Facility: CLINIC | Age: 74
End: 2024-03-07
Payer: MEDICARE

## 2024-03-07 DIAGNOSIS — M54.59 OTHER LOW BACK PAIN: Primary | ICD-10-CM

## 2024-03-07 PROCEDURE — 97811 ACUP 1/> W/O ESTIM EA ADD 15: CPT | Performed by: INTERNAL MEDICINE

## 2024-03-07 PROCEDURE — 97810 ACUP 1/> WO ESTIM 1ST 15 MIN: CPT | Performed by: INTERNAL MEDICINE

## 2024-03-07 NOTE — PROGRESS NOTES
Acupuncture Visit:     Subjective   Patient ID: Ludmila Bermudez is a 74 y.o. female who presents for No chief complaint on file.  Pt with .  Pt reported that she has intermittent back pain.  The pain is triggered by long car rides and bending forward in yard to  sticks.        Session Information  Is this acupuncture treatment being billed to the patient's insurance company: Yes  This is visit number: 7  The patient has a total number of visits of: 8  Name of Insurance Company: Medicare a/b  Name of Supervising Physician: MATHEW Lundberg  Visit Type: Follow-up visit         Review of Systems         Provider reviewed plan for the acupuncture session, precautions and contraindications. Patient/guardian/hospital staff has given consent to treat with full understanding of what to expect during the session. Before acupuncture began, provider explained to the patient to communicate at any time if the procedure was causing discomfort past their tolerance level. Patient agreed to advise acupuncturist. The acupuncturist counseled the patient on the risks of acupuncture treatment including pain, infection, bleeding, and no relief of pain. The patient was positioned comfortably. There was no evidence of infection at the site of needle insertions.    Objective   Physical Exam                             Assessment/Plan

## 2024-04-11 ENCOUNTER — ALLIED HEALTH (OUTPATIENT)
Dept: INTEGRATIVE MEDICINE | Facility: CLINIC | Age: 74
End: 2024-04-11
Payer: MEDICARE

## 2024-04-11 ENCOUNTER — APPOINTMENT (OUTPATIENT)
Dept: INTEGRATIVE MEDICINE | Facility: CLINIC | Age: 74
End: 2024-04-11
Payer: MEDICARE

## 2024-04-11 DIAGNOSIS — M54.59 OTHER LOW BACK PAIN: Primary | ICD-10-CM

## 2024-04-11 PROCEDURE — 97810 ACUP 1/> WO ESTIM 1ST 15 MIN: CPT | Performed by: INTERNAL MEDICINE

## 2024-04-11 PROCEDURE — 97811 ACUP 1/> W/O ESTIM EA ADD 15: CPT | Performed by: INTERNAL MEDICINE

## 2024-04-11 ASSESSMENT — ENCOUNTER SYMPTOMS: BACK PAIN: 1

## 2024-04-11 NOTE — PROGRESS NOTES
Acupuncture Visit:     Subjective   Patient ID: Ludmila Bermudez is a 74 y.o. female who presents for Back Pain  PT with .  She has intermittent back pain.  She only experiences pain when she over tired.  Her sleep is poor and unaffected by medication.  Her neck is a little swollen behind ear with intermittent catching in her neck.    Previous:  Pt with .  Pt reported that she has intermittent back pain.  The pain is triggered by long car rides and bending forward in yard to  sticks.      Back Pain      Session Information  Is this acupuncture treatment being billed to the patient's insurance company: Yes  This is visit number: 8  The patient has a total number of visits of: 8  Name of Insurance Company: Medicare a/b  Name of Supervising Physician: MATHEW Lundberg  Visit Type: Follow-up visit         Review of Systems   Musculoskeletal:  Positive for back pain.            Provider reviewed plan for the acupuncture session, precautions and contraindications. Patient/guardian/hospital staff has given consent to treat with full understanding of what to expect during the session. Before acupuncture began, provider explained to the patient to communicate at any time if the procedure was causing discomfort past their tolerance level. Patient agreed to advise acupuncturist. The acupuncturist counseled the patient on the risks of acupuncture treatment including pain, infection, bleeding, and no relief of pain. The patient was positioned comfortably. There was no evidence of infection at the site of needle insertions.    Objective   Physical Exam         Treatment Plan  Treatment Goals: Pain management  Pattern Differentiation: immune and adrenal imbalance  Treatment Principle: regulate adrenals and immune    Acupuncture Treatment  Patient Position: Lateral recumbent on a table  Needle Guage: 36 guage /.20/ Blue irin  Body Points - Left: gb 39, east wind, si 16, gb 21, st 9  Body Points - Bilateral: ub 22,  23, 24  Other Techniques Utilized: TDP Lamp, Topicals  Topicals Description: warming lotion orange frankinscense  TDP Lamp Descripton: victoriano men  Needle Count In: 11  Needle Count Out: 11  Needle Retention Time (min): 30 minutes  Total Face to Face Time (min): 30 minutes              Assessment/Plan

## 2024-04-15 ASSESSMENT — ENCOUNTER SYMPTOMS
FEVER: 0
BACK PAIN: 1
NECK STIFFNESS: 1
LEG SWELLING: 0
CHILLS: 0
VOMITING: 0
NAUSEA: 0
ABDOMINAL PAIN: 0
COUGH: 0
HEADACHES: 0
CONSTIPATION: 0
SHORTNESS OF BREATH: 0
DIARRHEA: 0
TROUBLE SWALLOWING: 0
LIGHT-HEADEDNESS: 0

## 2024-04-15 NOTE — PROGRESS NOTES
Patient ID: Ludmila Bermudez is a 74 y.o. female.  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 Sabino Ragsdale  MedOnc: Dr. Kyunghee Burkitt, X, Tash Gonzalez ([place])  RadOnc: Dr. Akhil Raphael    Prior Therapy  6/8 - 7/27/21 Concurrent chemoradiation w/ 7 doses of Cisplatin (40mg/m2),  VMAT 70 gy in 35 fx     ONCOLOGIC HISTORY   - 2/2021: Started to have bilateral ear aches. She was treated and eventually ended up with ear aches only on the left side. Her throat continues to bother her, eventually developed a left-sided neck mass. Further evaluation showed a left tonsillar lesion.  - 4/30/2021: CT Neck showed bilateral neck metastasis especially on the left side (~6 cm)  - 5/2021: Biopsy of left tonsillar lesion showed p16+ SCC  - 6/8 - 7/27/21: Concurrent chemoradiation with weekly cisplatin (chemo held on 7/7 due to low platelet, but received total 7 treatments of weekly cisplatin-made up chemo was given on the last date of radiation)  - 10/28/21 Post treatment PET/CT showed very good response, but possible residual metabolic activity in Left level  3 and 4 nodes  - 1/4/22 CT Neck w/ contrast to f/u Left level 3 and 4 nodes, showed subtle irregularity of a left level 2 lymph node raising suspicion for neoplastic involvement.   - 1/31/22 FNA of Level 2 lymph node was non-diagnostic d/t insufficient cellularity  - 3/7/22 Underwent Excisional biopsy of Left level 2 node with Dr. Ragsdale. Path showing Extensive necrosis with fibro-inflammatory and xanthomatous reaction. No definite viable tumor identified.       Treatment complicated by:  - 7/31/21 Admitted due to worsening mucositis and poor oral intake. Prolonged hospitalization due to thrombocytopenia (most  likely ITP) and hematochezia. Thrombocytopenia is likely infection related vs meds side effects. She received Dexa 40mg 4/4 (8/8 - 8/11), two sessions  of IVIG ( - ), IVIG 0.4 mg/kg daily with platelets to follow (- ), s/p 2 doses of Nplate (  and )    - 21: DVT on right axillary vein  - GI bleed: seen by GI, likely secondary to low platelet count.  Per GI, low concern for UGIB.  - 21 Colonoscopy: biopsy of descending colon polyp showed tubular adenoma  -  - 21: Readmitted due to DVT in  LLE.  Started on Lovenox, but stopped due to platelets dropped. Work up for heparin induced thrombocytopenia is negative.  Started on Xarelto on 9/10/21 evening with close follow up with hematology.  - 21: Platelet 61. Taking Xarelto 15mg BID. Per hematology, decrease Xarelto to 15mg daily if platelet is below     PmHx: hx of left tonsil SCC, anxiety, depression, HLD  PsHx: radial head implant ()  FmHx: mother (colorectal cancer at age 67,  at age 94)  ScHx: lives with family, non-smoker, occasional alcohol use, retired  Allergies: Penicillins    Past Medical History:   No past medical history on file.   Surgical History:    Past Surgical History:   Procedure Laterality Date    OTHER SURGICAL HISTORY  2021    Elbow fracture repair      Family History:    Family History   Problem Relation Name Age of Onset    Rectal cancer Mother      Other (appendix disease) Father       Family Oncology History:    Cancer-related family history includes Rectal cancer in her mother.  Social History:    Social History     Tobacco Use    Smoking status: Never    Smokeless tobacco: Never          Subjective   Chief Complaint: Squamous Cell Carcinoma of left tonsil, follow up    HPI  Interval History  Ludmila Bermudez is a 74 y.o. female with h/o p16+ SCC of left tonsil with bilateral neck disease s/p concurrent chemoradiation   - 21 with 7 doses of weekly cisplatin, 70gy RT.       Patient presents today accompanied by her  for 3 year post treatment follow up, reports the following:     She continue to feel very well over all. Energy  is great. Her and her  have been driving to many states to watch their grandson's [employer] games.   She has mild chronic dysphagia caused by dry mouth.   Has some tightness in her neck and mild swelling. She has not had any lymphedema therapy.  She went to PT in June 2023 for back pain as she has multilevel degenerative change in the L spine. She's having acupuncture (Xena Duncan) for saliva, neck stiff, back pain, and neuropathy in her feet.   She is not sleeping well due to dry mouth at night and neuropathy in her feet.  She has sensation of pins & needles in the left foot that is bothersome to her. Only taking Gabapentin 300mg at bedtime. We can increased this to 300mg TID.   She's following a dentist Q6 mo.    ROS  Review of Systems   Constitutional:  Negative for chills and fever.   HENT:   Negative for hearing loss, lump/mass, mouth sores, nosebleeds, tinnitus and trouble swallowing.         Dry mouth   Respiratory:  Negative for cough and shortness of breath.    Cardiovascular:  Negative for chest pain and leg swelling.   Gastrointestinal:  Negative for abdominal pain, constipation, diarrhea, nausea and vomiting.   Musculoskeletal:  Positive for back pain and neck stiffness.   Skin:  Negative for rash.   Neurological:  Positive for numbness (Perpheral neuropathy in hands and feet). Negative for headaches and light-headedness.       Allergies  Allergies   Allergen Reactions    Penicillins Rash    Sulfamethoxazole Unknown     Feeling of numbness        Medications  Current Outpatient Medications   Medication Instructions    acetaminophen (TYLENOL ORAL) oral    cholecalciferol (Vitamin D3) 5,000 Units tablet oral, Daily    cyanocobalamin, vitamin B-12, (VITAMIN B-12 ORAL) 1,000 mcg, oral    gabapentin (NEURONTIN) 300 mg, oral, 3 times daily    Lipitor 20 mg tablet 1 tablet, oral, Nightly    vit C/E/Zn/coppr/lutein/zeaxan (PRESERVISION AREDS-2 ORAL) oral, 2 times daily          Objective   VS: BP  152/54 (BP Location: Left arm, Patient Position: Sitting, BP Cuff Size: Large adult)   Pulse 71   Temp 36.3 °C (97.3 °F)   Resp 17   Wt 78.1 kg (172 lb 3.2 oz)   SpO2 99%   BMI 29.56 kg/m²   Weight: Daily Weight  04/26/24 : 78.1 kg (172 lb 3.2 oz)  04/26/24 : 77.2 kg (170 lb 4.8 oz)  12/22/23 : 77.1 kg (170 lb)  12/22/23 : 77.6 kg (171 lb)  05/19/23 : 76.9 kg (169 lb 7 oz)  02/17/23 : 74.4 kg (164 lb)  02/16/23 : 74.6 kg (164 lb 7.4 oz)      Physical Exam  Constitutional:       Appearance: Normal appearance. She is well-developed.   HENT:      Head: Normocephalic and atraumatic.      Right Ear: External ear normal. No tenderness.      Left Ear: External ear normal. No tenderness.      Nose: Nose normal.      Mouth/Throat:      Mouth: No injury or oral lesions.      Tongue: No lesions.   Eyes:      Extraocular Movements: Extraocular movements intact.      Conjunctiva/sclera: Conjunctivae normal.      Pupils: Pupils are equal, round, and reactive to light.   Neck:      Thyroid: No thyroid mass.   Cardiovascular:      Rate and Rhythm: Normal rate and regular rhythm.      Pulses: Normal pulses.      Heart sounds: No murmur heard.     No gallop.   Pulmonary:      Effort: Pulmonary effort is normal. No respiratory distress.      Breath sounds: Normal breath sounds. No stridor. No wheezing.   Abdominal:      General: Bowel sounds are normal. There is no distension or abdominal bruit.      Palpations: Abdomen is soft.      Tenderness: There is no abdominal tenderness.   Musculoskeletal:         General: Normal range of motion.      Cervical back: Normal range of motion and neck supple. No signs of trauma. Normal range of motion.   Lymphadenopathy:      Upper Body:      Right upper body: No axillary adenopathy.      Left upper body: No axillary adenopathy.   Skin:     General: Skin is warm and dry.      Comments: No new skin lesions   Neurological:      General: No focal deficit present.      Mental Status: She is  "alert and oriented to person, place, and time.      Gait: Gait is intact.   Psychiatric:         Mood and Affect: Mood and affect normal.         Behavior: Behavior is cooperative.         Diagnostic Results   Labs  Below labs are reviewed today.  Results from last 7 days   Lab Units 04/26/24  0932   WBC AUTO x10*3/uL 4.5   HEMOGLOBIN g/dL 13.1   HEMATOCRIT % 40.5   PLATELETS AUTO x10*3/uL 168   NEUTROS ABS x10*3/uL 2.50   LYMPHS ABS AUTO x10*3/uL 1.35   MONOS ABS AUTO x10*3/uL 0.52   EOS ABS AUTO x10*3/uL 0.06   NEUTROS PCT AUTO % 56.3   LYMPHS PCT AUTO % 30.3   MONOS PCT AUTO % 11.7   EOS PCT AUTO % 1.3      Results from last 7 days   Lab Units 04/26/24  0932   GLUCOSE mg/dL 99   SODIUM mmol/L 141   POTASSIUM mmol/L 4.2   CHLORIDE mmol/L 103   CO2 mmol/L 30   BUN mg/dL 21   CREATININE mg/dL 0.74   EGFR mL/min/1.73m*2 86   CALCIUM mg/dL 9.8   ALBUMIN g/dL 4.6   PROTEIN TOTAL g/dL 6.8   BILIRUBIN TOTAL mg/dL 0.5   ALK PHOS U/L 104   ALT U/L 21   AST U/L 19     Results from last 7 days   Lab Units 04/26/24  0932   TSH mIU/L 3.16               Images       Pathology  Lab Results   Component Value Date    PATHREP  03/07/2022     Name OK GREENWOOD                                                                                                   Accession #: [id number]            Pathologist:                   WADAD S. MNEIMNEH, MD  Date of Procedure:    3/7/2022  Date Received:          3/7/2022  Date Reported           3/8/2022  Submitting Physician:   FARNAZ FINK MD  Location:                    OR  Other External #                                                                    FINAL DIAGNOSIS  \"LEFT LEVEL 2 NODE\", RESECTION:  -- EXTENSIVE NECROSIS WITH FIBROINFLAMMATORY AND XANTHOMATOUS REACTION (SEE  NOTE).  -- NO DEFINITE VIABLE TUMOR IDENTIFIED.    Note: The tissue is entirely submitted for examination. The nodule consists of  necrosis with fibroinflammatory and xanthomatous reaction and " cholesterol  clefts. Within the necrosis, there is occasional markedly degenerated  keratinous material. These findings most likely represent a previously replaced  lymph node by neoplasm with extensive/complete treatment response.  No viable  tumor is identified.                                                                                                                                                                                                                                                                                                                                                                                                                                                                                       Electronically Signed Out By WADAD S. MNEIMNEH, MD/WSTERE  By the signature on this report, the individual or group listed as making the  Final Interpretation/Diagnosis certifies that they have reviewed this case.         Intraoperative Consultation:  A: LEFT LEVEL 2 NODE ( RULE OUT CANCER)    Frozen Section 1:  Date Ordered: 3/7/2022 13:04     Date Received: 3/7/2022 13:04     Date Called:  3/7/2022 13:27    Intraoperative Diagnosis:  A.  Negative for malignancy.  B.  Necrotizing granuloma.  Intraoperative Consult Pathologist(s):  HIREN REESE MD (P)      aaa/3/7/2022           Clinical History:  tonsillar cancer, status post chemoradiation      Specimens Submitted As:  A: LEFT LEVEL 2 NODE ( RULE OUT CANCER)     Gross Description:  Received fresh for intraoperative consultation, labeled with the patient's name  and hospital number, is a 2.5 x 1.5 x 0.8 cm possible lymph node with attached  adipose tissue. The lymph node is serially sectioned. The entire lymph node was  submitted for intraoperative diagnosis and the tissue remnants in 2 cassettes,  A1 FSC-A2 FSC. The  remaining fibroadipose tissue is submitted for permanent in  one cassette, A3. AAA/RL    aaa/3/7/2022                [facility]  Department of Pathology   [id number] [place] [address]        PATHREP  01/31/2022                                                     MRN: [id number]  Patient Name OK GREENWOOD  Accession #: [id number]  Date of Procedure:  1/31/2022       Date Reported: 2/2/2022  Date Received:  1/31/2022  Date of Birth / Sex 1950 (Age: 71) / F  Race: WHITE  Submitting Physician: AFRNAZ FINK MD  Attending Physician: NABIL SINGH MD           Other External #          FINAL CYTOLOGICAL INTERPRETATION    A.  FINE NEEDLE ASPIRATION LEFT NECK LYMPH NODE, CYTOLOGY AND CELL BLOCK:       NONDIAGNOSTIC SPECIMEN DUE TO INSUFFICIENT CELLULARITY.        Interpretation performed at:  [facility]  Department of Pathology  [address] [place] [address]  Phone: [phone] Fax: [phone]      Slide(s) initially screened by a Cytotechnologist at [facility], [address] [place] [address]      Electronically Signed Out By COLTON VASQUEZ MD    By the signature on this report, the individual or group listed as making the  Final Interpretation/Diagnosis certifies that they have reviewed this case.  Slide(s) initially screened by a Cytotechnologist at [facility]     Clinical History      Physician Contact Number: [phone]  Clinical Diagnosis History ENLARGED LYMPH NODE   Source of Specimen  A: FINE NEEDLE ASPIRATION LEFT NECK LYMPH NODE     Specimen Submitted as:  A:  FINE NEEDLE ASPIRATION LEFT NECK LYMPH NODE        Pap stain Non-Gyn, Pap stain Non-Gyn, Pap stain Non-Gyn, Pap stain  Non-Gyn, Diff-Quik stain Non-Gyn, Diff-Quik stain Non-Gyn, Diff-Quik stain  Non-Gyn, Diff-Quik stain Non-Gyn, CELL BLOCK, H&E, Initial    Gross Description  A.  FINE NEEDLE ASPIRATION LEFT NECK LYMPH NODE:  RECEIVED 8 DIRECT SMEARS (4  AIR-DRIED AND 4 ALCOHOL-FIXED) AND  "30 cc OF PINK TINTED, CLEAR NEEDLE RINSE IN  CYTOLYT WITH FEW PARTICLES.                 [facility]  Department of Pathology  [address] [place] [address]             Assessment/Plan   ASSESSMENT  Ludmila Bermudez is a 74 y.o. female with hx of p16+ SCC of left tonsil with bilateral neck disease s/p concurrent chemoradiation with weekly cispl atin  6/8/ - 7/27/21, received 7 cycles of Cisplatin, 70 gy RT. Restaging PET/CT 10/28/21 showed very good response with possible residual metabolic activity in Level 3 & 4 nodes. Excisional biopsy 3/7/22 did not review evidence of metastatic disease.     # Left Tonsil SCC,  p16+  - 10/28/21 Post treatment PET/CT showed very good response with possible residual metabolic activity in Level 3 & 4 nodes.  - CT Neck 1/4/21 showed suspicious left cervical lymph node at level 2. FNA of left level 2 lymph node was biopsied on 1/31/22 however not enough tissues were obtained. Open biopsy on 3/7/22 with Dr. Ragsdale showed no malignancy.  - Had covid in January 2023 but is largely resolved, has a mild lingering cough.   - Patient is 2 year 4 month from last treatment, doing well overall. 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 Advise patient try Biotene gel and xylimelt for dry mouth atnight.   - 4/26/24: Patient continue to do very well 3 years from treatment. Chronic SE from chemoRT including Xerostomia, mild dysphagia are stable/improving.    # Peripheral Neuropathy  - Patient with ongoing neuropathy in b/l hands and feet. Worst in the left foot and is causing her to wake up at night. Only taking Gabapentin 300mg at bedtime, but is still symptomatic  - Will increase to Gabapentin 300mg TID. Encourage her to engage Acupuncture to help with peripheral neuropathy as well.    # Lymphedema  - Patient with muscle stiffness and some swelling in the neck. Left neck \"catches\" sometimes. Likely 2ry to scar tissue and lymphedema from " radiation. Could benefit from lymphedema therapy.     # Low Back Pain  - 1.5 h/o LBP, onset after long road trips. Back pain occurs with muscle spasm and radiation to muscle of lumbar region. Finds some relief when leaning forward on grocery carts  - Could be arthritis vs spinal stenosis, given h/o cancer could also be mets.   - Patient scheduled to start PT on 6/1  - MRI Lumbar/Sacrum did not show any neoplasm related change, though does note narrowing of multilevel degenerative changes of the lumbar spine.  - Patient completed PT and continues with acupuncture for LBP which has been very helpful     # Left side of tongue pain: improved  - Lesion noted on left side of tongue, patient has had pain there since starting CRT. Previously thought it was d/t irritation from adjacent silver cavity filling, but pain persists after filling was  changed to plastic. Likely SE from radiation      # Xerostomia/dysgeusia  - Improving with acupuncture notes saliva production returning, seeing acupuncturist Q2wks  - Encourage patient to try Xylimelt again or Biotene oral balance gel to see if it can provide more lasting dry mouth relief at night.     # Eczema    - Red scaly rash on low back, belly, nape of neck, saw Derm, dx with Eczema and was prescribed topical steroids. Pt is not always compliant with this and she scratches at the rash. Advised patient to  follow derm recs     # Elevated Ferritin  - No family history of hemachromatosis. She is post menopausal and ferritin is <300, TSAT 26% (NOT >45%). Pt without s/s that would indicate hemochromatosis I.e fatigue, generalized pain. Liver enzymes WNL, no s/s of iron overload. As Ferritin is elevated but TSAT is normal, more likely the elevated ferritin is due to chronic inflammatory state.       PLAN  -- RTC in 6 mo with ENT/MedOnc on 10/15/24  -- START Gabapentin 300mg TID for left foot neuropathy. Start with Gabapentin 300mg BID for 1 week, if tolerating will increase to TID. 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

## 2024-04-25 NOTE — PROGRESS NOTES
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 She received a combination of chemotherapy and radiation. I cannot appreciate any recurrent tumor.  Chest x-ray will be obtained today.     Minimal dysphagia.    Impacted cerumen in both ears which was addressed with a small instrument.     I will see her in 6 months.      Chief Complaint     Follow-up regarding the management of a left tonsillar cancer.      History of Present Illness    This lady was seen in May 2021 at the request of a local colleague. Back in February 2020 when she started complaining of bilateral earaches. She was treated and eventually ended up with earaches only on the left side. Her throat continues to bother her. She eventually developed a left-sided neck mass. This led to further evaluation which showed a left tonsillar lesion. This was biopsied and consistent with a p16 positive squamous cell carcinoma. She also had a CT scan of her neck that shows bilateral neck metastasis especially on the left side. She received a combination of chemotherapy and radiation. She finished on July 27, 2021. She really has had all sorts of issues during her treatments and ended up being in the hospital for 27 days. She had a PET scan done on October 28, 2021. I personally reviewed that scan that shows an uptake in the left a level 2. On January 4, 2022 she had a CT scan that I personally reviewed that shows there is a 1 cm node in the left level 2 that was described as suspicious by the radiologist. She was sent for an ultrasound-guided needle aspirate. Unfortunately this was not diagnostic. She was eventually brought to the operating room on March 7, 2022. The lymph node was removed. It did not show any cancer. She does have some limitation in swallowing because of dryness. She denies any pain or discomfort. She had a chest x-ray in  May 2023 that was negative. She had a TSH level in April 2024 which was also normal.     Physical Exam       Examination of the oral cavity and oropharynx shows evidence of dryness but no evidence of any suspicious lesions. There is good mobility of the tongue and palate. There is good mandibular excursion. Palpation of the parotid, neck, and thyroid field is negative for any masses or adenopathies.  Her left neck is somewhat indurated.     A flexible laryngoscopy was carried out. Under topical Xylocaine and Moiz-Synephrine the scope was introduced through the nostril. The nasopharynx, base of tongue, hypopharynx, and larynx are visualized. The vocal cords are normally mobile. There is a fair amount of puffiness. There is some pooling of secretions.      The ear examination shows some impacted cerumen on both sides.  This was addressed with small instruments.

## 2024-04-26 ENCOUNTER — OFFICE VISIT (OUTPATIENT)
Dept: OTOLARYNGOLOGY | Facility: HOSPITAL | Age: 74
End: 2024-04-26
Payer: MEDICARE

## 2024-04-26 ENCOUNTER — HOSPITAL ENCOUNTER (OUTPATIENT)
Dept: RADIOLOGY | Facility: HOSPITAL | Age: 74
Discharge: HOME | End: 2024-04-26
Payer: MEDICARE

## 2024-04-26 ENCOUNTER — LAB (OUTPATIENT)
Dept: LAB | Facility: HOSPITAL | Age: 74
End: 2024-04-26
Payer: MEDICARE

## 2024-04-26 ENCOUNTER — OFFICE VISIT (OUTPATIENT)
Dept: HEMATOLOGY/ONCOLOGY | Facility: HOSPITAL | Age: 74
End: 2024-04-26
Payer: MEDICARE

## 2024-04-26 VITALS
TEMPERATURE: 97.3 F | WEIGHT: 172.2 LBS | RESPIRATION RATE: 17 BRPM | HEART RATE: 71 BPM | OXYGEN SATURATION: 99 % | BODY MASS INDEX: 29.56 KG/M2 | SYSTOLIC BLOOD PRESSURE: 152 MMHG | DIASTOLIC BLOOD PRESSURE: 54 MMHG

## 2024-04-26 VITALS — BODY MASS INDEX: 29.08 KG/M2 | WEIGHT: 170.3 LBS | HEIGHT: 64 IN | TEMPERATURE: 97.3 F

## 2024-04-26 DIAGNOSIS — T45.1X5A PERIPHERAL NEUROPATHY DUE TO CHEMOTHERAPY (MULTI): ICD-10-CM
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
## 2024-04-26 DIAGNOSIS — C09.9 MALIGNANT NEOPLASM OF TONSIL (MULTI): Primary | ICD-10-CM
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
## 2024-04-26 DIAGNOSIS — Z08 ENCOUNTER FOR FOLLOW-UP SURVEILLANCE OF HEAD AND NECK CANCER: ICD-10-CM

## 2024-04-26 DIAGNOSIS — C77.0 METASTASIS TO HEAD AND NECK LYMPH NODE (MULTI): ICD-10-CM

## 2024-04-26 DIAGNOSIS — E03.2 HYPOTHYROIDISM DUE TO MEDICATION: ICD-10-CM

## 2024-04-26 DIAGNOSIS — K11.7 XEROSTOMIA DUE TO RADIOTHERAPY: ICD-10-CM

## 2024-04-26 DIAGNOSIS — I89.0 LYMPHEDEMA DUE TO AND NOT CONCURRENT WITH IONIZING RADIOTHERAPY: ICD-10-CM

## 2024-04-26 DIAGNOSIS — C09.9 MALIGNANT NEOPLASM OF TONSIL (MULTI): ICD-10-CM

## 2024-04-26 DIAGNOSIS — G62.0 PERIPHERAL NEUROPATHY DUE TO CHEMOTHERAPY (MULTI): ICD-10-CM

## 2024-04-26 DIAGNOSIS — H61.23 BILATERAL IMPACTED CERUMEN: Primary | ICD-10-CM

## 2024-04-26 DIAGNOSIS — Z85.89 ENCOUNTER FOR FOLLOW-UP SURVEILLANCE OF HEAD AND NECK CANCER: ICD-10-CM

## 2024-04-26 PROBLEM — L03.221 CELLULITIS AND ABSCESS OF NECK: Status: RESOLVED | Noted: 2023-10-10 | Resolved: 2024-04-26

## 2024-04-26 PROBLEM — E83.42 HYPOMAGNESEMIA: Status: RESOLVED | Noted: 2023-10-10 | Resolved: 2024-04-26

## 2024-04-26 PROBLEM — K92.1 HEMATOCHEZIA: Status: RESOLVED | Noted: 2023-10-10 | Resolved: 2024-04-26

## 2024-04-26 PROBLEM — R06.00 DYSPNEA: Status: RESOLVED | Noted: 2023-10-10 | Resolved: 2024-04-26

## 2024-04-26 PROBLEM — L02.11 CELLULITIS AND ABSCESS OF NECK: Status: RESOLVED | Noted: 2023-10-10 | Resolved: 2024-04-26

## 2024-04-26 LAB
ALBUMIN SERPL BCP-MCNC: 4.6 G/DL (ref 3.4–5)
ALP SERPL-CCNC: 104 U/L (ref 33–136)
ALT SERPL W P-5'-P-CCNC: 21 U/L (ref 7–45)
ANION GAP SERPL CALC-SCNC: 12 MMOL/L (ref 10–20)
AST SERPL W P-5'-P-CCNC: 19 U/L (ref 9–39)
BASOPHILS # BLD AUTO: 0.01 X10*3/UL (ref 0–0.1)
BASOPHILS NFR BLD AUTO: 0.2 %
BILIRUB SERPL-MCNC: 0.5 MG/DL (ref 0–1.2)
BUN SERPL-MCNC: 21 MG/DL (ref 6–23)
CALCIUM SERPL-MCNC: 9.8 MG/DL (ref 8.6–10.3)
CHLORIDE SERPL-SCNC: 103 MMOL/L (ref 98–107)
CO2 SERPL-SCNC: 30 MMOL/L (ref 21–32)
CREAT SERPL-MCNC: 0.74 MG/DL (ref 0.5–1.05)
EGFRCR SERPLBLD CKD-EPI 2021: 86 ML/MIN/1.73M*2
EOSINOPHIL # BLD AUTO: 0.06 X10*3/UL (ref 0–0.4)
EOSINOPHIL NFR BLD AUTO: 1.3 %
ERYTHROCYTE [DISTWIDTH] IN BLOOD BY AUTOMATED COUNT: 12 % (ref 11.5–14.5)
GLUCOSE SERPL-MCNC: 99 MG/DL (ref 74–99)
HCT VFR BLD AUTO: 40.5 % (ref 36–46)
HGB BLD-MCNC: 13.1 G/DL (ref 12–16)
IMM GRANULOCYTES # BLD AUTO: 0.01 X10*3/UL (ref 0–0.5)
IMM GRANULOCYTES NFR BLD AUTO: 0.2 % (ref 0–0.9)
LYMPHOCYTES # BLD AUTO: 1.35 X10*3/UL (ref 0.8–3)
LYMPHOCYTES NFR BLD AUTO: 30.3 %
MCH RBC QN AUTO: 31.8 PG (ref 26–34)
MCHC RBC AUTO-ENTMCNC: 32.3 G/DL (ref 32–36)
MCV RBC AUTO: 98 FL (ref 80–100)
MONOCYTES # BLD AUTO: 0.52 X10*3/UL (ref 0.05–0.8)
MONOCYTES NFR BLD AUTO: 11.7 %
NEUTROPHILS # BLD AUTO: 2.5 X10*3/UL (ref 1.6–5.5)
NEUTROPHILS NFR BLD AUTO: 56.3 %
NRBC BLD-RTO: 0 /100 WBCS (ref 0–0)
PLATELET # BLD AUTO: 168 X10*3/UL (ref 150–450)
POTASSIUM SERPL-SCNC: 4.2 MMOL/L (ref 3.5–5.3)
PROT SERPL-MCNC: 6.8 G/DL (ref 6.4–8.2)
RBC # BLD AUTO: 4.12 X10*6/UL (ref 4–5.2)
SODIUM SERPL-SCNC: 141 MMOL/L (ref 136–145)
TSH SERPL-ACNC: 3.16 MIU/L (ref 0.44–3.98)
WBC # BLD AUTO: 4.5 X10*3/UL (ref 4.4–11.3)

## 2024-04-26 PROCEDURE — 31575 DIAGNOSTIC LARYNGOSCOPY: CPT | Performed by: OTOLARYNGOLOGY

## 2024-04-26 PROCEDURE — 71046 X-RAY EXAM CHEST 2 VIEWS: CPT

## 2024-04-26 PROCEDURE — 85025 COMPLETE CBC W/AUTO DIFF WBC: CPT

## 2024-04-26 PROCEDURE — 1036F TOBACCO NON-USER: CPT | Performed by: STUDENT IN AN ORGANIZED HEALTH CARE EDUCATION/TRAINING PROGRAM

## 2024-04-26 PROCEDURE — 1036F TOBACCO NON-USER: CPT | Performed by: OTOLARYNGOLOGY

## 2024-04-26 PROCEDURE — 99213 OFFICE O/P EST LOW 20 MIN: CPT | Mod: 25 | Performed by: OTOLARYNGOLOGY

## 2024-04-26 PROCEDURE — 1157F ADVNC CARE PLAN IN RCRD: CPT | Performed by: STUDENT IN AN ORGANIZED HEALTH CARE EDUCATION/TRAINING PROGRAM

## 2024-04-26 PROCEDURE — 84075 ASSAY ALKALINE PHOSPHATASE: CPT

## 2024-04-26 PROCEDURE — 71046 X-RAY EXAM CHEST 2 VIEWS: CPT | Performed by: STUDENT IN AN ORGANIZED HEALTH CARE EDUCATION/TRAINING PROGRAM

## 2024-04-26 PROCEDURE — 99215 OFFICE O/P EST HI 40 MIN: CPT | Performed by: STUDENT IN AN ORGANIZED HEALTH CARE EDUCATION/TRAINING PROGRAM

## 2024-04-26 PROCEDURE — 1159F MED LIST DOCD IN RCRD: CPT | Performed by: STUDENT IN AN ORGANIZED HEALTH CARE EDUCATION/TRAINING PROGRAM

## 2024-04-26 PROCEDURE — 36415 COLL VENOUS BLD VENIPUNCTURE: CPT

## 2024-04-26 PROCEDURE — 84443 ASSAY THYROID STIM HORMONE: CPT | Performed by: STUDENT IN AN ORGANIZED HEALTH CARE EDUCATION/TRAINING PROGRAM

## 2024-04-26 PROCEDURE — 1160F RVW MEDS BY RX/DR IN RCRD: CPT | Performed by: OTOLARYNGOLOGY

## 2024-04-26 PROCEDURE — 69210 REMOVE IMPACTED EAR WAX UNI: CPT | Performed by: OTOLARYNGOLOGY

## 2024-04-26 PROCEDURE — 1126F AMNT PAIN NOTED NONE PRSNT: CPT | Performed by: STUDENT IN AN ORGANIZED HEALTH CARE EDUCATION/TRAINING PROGRAM

## 2024-04-26 PROCEDURE — 1160F RVW MEDS BY RX/DR IN RCRD: CPT | Performed by: STUDENT IN AN ORGANIZED HEALTH CARE EDUCATION/TRAINING PROGRAM

## 2024-04-26 PROCEDURE — 99213 OFFICE O/P EST LOW 20 MIN: CPT | Performed by: OTOLARYNGOLOGY

## 2024-04-26 PROCEDURE — 1159F MED LIST DOCD IN RCRD: CPT | Performed by: OTOLARYNGOLOGY

## 2024-04-26 RX ORDER — GABAPENTIN 300 MG/1
300 CAPSULE ORAL 3 TIMES DAILY
Qty: 90 CAPSULE | Refills: 3 | Status: SHIPPED | OUTPATIENT
Start: 2024-04-26

## 2024-04-26 RX ORDER — GABAPENTIN 100 MG/1
CAPSULE ORAL
COMMUNITY
Start: 2021-08-26 | End: 2024-04-26 | Stop reason: SDUPTHER

## 2024-04-26 ASSESSMENT — ENCOUNTER SYMPTOMS
DEPRESSION: 0
NUMBNESS: 1

## 2024-04-26 ASSESSMENT — COLUMBIA-SUICIDE SEVERITY RATING SCALE - C-SSRS
6. HAVE YOU EVER DONE ANYTHING, STARTED TO DO ANYTHING, OR PREPARED TO DO ANYTHING TO END YOUR LIFE?: NO
2. HAVE YOU ACTUALLY HAD ANY THOUGHTS OF KILLING YOURSELF?: NO
1. IN THE PAST MONTH, HAVE YOU WISHED YOU WERE DEAD OR WISHED YOU COULD GO TO SLEEP AND NOT WAKE UP?: NO

## 2024-04-26 ASSESSMENT — PATIENT HEALTH QUESTIONNAIRE - PHQ9
1. LITTLE INTEREST OR PLEASURE IN DOING THINGS: NOT AT ALL
2. FEELING DOWN, DEPRESSED OR HOPELESS: NOT AT ALL
SUM OF ALL RESPONSES TO PHQ9 QUESTIONS 1 & 2: 0

## 2024-04-26 ASSESSMENT — PAIN SCALES - GENERAL: PAINLEVEL: 0-NO PAIN

## 2024-04-29 ASSESSMENT — ENCOUNTER SYMPTOMS
HEADACHES: 0
LEG SWELLING: 0
NECK STIFFNESS: 1
BACK PAIN: 1
DIARRHEA: 0
FEVER: 0
NAUSEA: 0
CHILLS: 0
ABDOMINAL PAIN: 0
CONSTIPATION: 0
SHORTNESS OF BREATH: 0
TROUBLE SWALLOWING: 0
NUMBNESS: 1
LIGHT-HEADEDNESS: 0
VOMITING: 0
COUGH: 0

## 2024-04-29 NOTE — PROGRESS NOTES
Patient ID: Ludmila Bermudez is a 74 y.o. female.  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 Sabino Ragsdale  MedOnc: Dr. Kyunghee Burkitt, X, Tash Gonzalez (PA)  RadOnc: Dr. Akhil Raphael    Prior Therapy  6/8 - 7/27/21 Concurrent chemoradiation w/ 7 doses of Cisplatin (40mg/m2),  VMAT 70 gy in 35 fx     ONCOLOGIC HISTORY   - 2/2021: Started to have bilateral ear aches. She was treated and eventually ended up with ear aches only on the left side. Her throat continues to bother her, eventually developed a left-sided neck mass. Further evaluation showed a left tonsillar lesion.  - 4/30/2021: CT Neck showed bilateral neck metastasis especially on the left side (~6 cm)  - 5/2021: Biopsy of left tonsillar lesion showed p16+ SCC  - 6/8 - 7/27/21: Concurrent chemoradiation with weekly cisplatin (chemo held on 7/7 due to low platelet, but received total 7 treatments of weekly cisplatin-made up chemo was given on the last date of radiation)  - 10/28/21 Post treatment PET/CT showed very good response, but possible residual metabolic activity in Left level  3 and 4 nodes  - 1/4/22 CT Neck w/ contrast to f/u Left level 3 and 4 nodes, showed subtle irregularity of a left level 2 lymph node raising suspicion for neoplastic involvement.   - 1/31/22 FNA of Level 2 lymph node was non-diagnostic d/t insufficient cellularity  - 3/7/22 Underwent Excisional biopsy of Left level 2 node with Dr. Ragsdale. Path showing Extensive necrosis with fibro-inflammatory and xanthomatous reaction. No definite viable tumor identified.       Treatment complicated by:  - 7/31/21 Admitted due to worsening mucositis and poor oral intake. Prolonged hospitalization due to thrombocytopenia (most  likely ITP) and hematochezia. Thrombocytopenia is likely infection related vs meds side effects. She received Dexa 40mg 4/4 (8/8 - 8/11), two sessions  of IVIG ( - ), IVIG 0.4 mg/kg daily with platelets to follow (- ), s/p 2 doses of Nplate (  and )    - 21: DVT on right axillary vein  - GI bleed: seen by GI, likely secondary to low platelet count.  Per GI, low concern for UGIB.  - 21 Colonoscopy: biopsy of descending colon polyp showed tubular adenoma  -  - 21: Readmitted due to DVT in  LLE.  Started on Lovenox, but stopped due to platelets dropped. Work up for heparin induced thrombocytopenia is negative.  Started on Xarelto on 9/10/21 evening with close follow up with hematology.  - 21: Platelet 61. Taking Xarelto 15mg BID. Per hematology, decrease Xarelto to 15mg daily if platelet is below     PmHx: hx of left tonsil SCC, anxiety, depression, HLD  PsHx: radial head implant ()  FmHx: mother (colorectal cancer at age 67,  at age 94)  ScHx: lives with family, non-smoker, occasional alcohol use, retired  Allergies: Penicillins    Past Medical History:   No past medical history on file.   Surgical History:    Past Surgical History:   Procedure Laterality Date    OTHER SURGICAL HISTORY  2021    Elbow fracture repair      Family History:    Family History   Problem Relation Name Age of Onset    Rectal cancer Mother      Other (appendix disease) Father       Family Oncology History:    Cancer-related family history includes Rectal cancer in her mother.  Social History:    Social History     Tobacco Use    Smoking status: Never    Smokeless tobacco: Never          Subjective   Chief Complaint: Squamous Cell Carcinoma of left tonsil, follow up    HPI  Interval History  Ludmila Bermudez is a 74 y.o. female with h/o p16+ SCC of left tonsil with bilateral neck disease s/p concurrent chemoradiation   - 21 with 7 doses of weekly cisplatin, 70gy RT.       Patient presents today accompanied by her  for 3 year post treatment follow up, reports the following:    After increasing Gabapentin to 300mg BID, her  neuropathy has improved significantly. She also started using Xylimelt which has helped with dry mouth overnight.     Below were from appt last week:    She continue to feel very well over all. Energy is great. Her and her  have been driving to many states to watch their grandson's Lacrosse games.   She has mild chronic dysphagia caused by dry mouth.   Has some tightness in her neck and mild swelling. She has not had any lymphedema therapy.  She went to PT in June 2023 for back pain as she has multilevel degenerative change in the L spine. She's having acupuncture (Xena Duncan) for saliva, neck stiff, back pain, and neuropathy in her feet.   She is not sleeping well due to dry mouth at night and neuropathy in her feet.  She has sensation of pins & needles in the left foot that is bothersome to her. Only taking Gabapentin 300mg at bedtime. We can increased this to 300mg TID.   She's following a dentist Q6 mo.    ROS  Review of Systems   Constitutional:  Negative for chills and fever.   HENT:   Negative for hearing loss, lump/mass, mouth sores, nosebleeds, tinnitus and trouble swallowing.         Dry mouth   Respiratory:  Negative for cough and shortness of breath.    Cardiovascular:  Negative for chest pain and leg swelling.   Gastrointestinal:  Negative for abdominal pain, constipation, diarrhea, nausea and vomiting.   Musculoskeletal:  Positive for back pain and neck stiffness.   Skin:  Negative for rash.   Neurological:  Positive for numbness (Perpheral neuropathy in hands and feet). Negative for headaches and light-headedness.       Allergies  Allergies   Allergen Reactions    Penicillins Rash    Sulfamethoxazole Unknown     Feeling of numbness        Medications  Current Outpatient Medications   Medication Instructions    acetaminophen (TYLENOL ORAL) oral    cholecalciferol (Vitamin D3) 5,000 Units tablet oral, Daily    cyanocobalamin, vitamin B-12, (VITAMIN B-12 ORAL) 1,000 mcg, oral    gabapentin  (NEURONTIN) 300 mg, oral, 3 times daily    Lipitor 20 mg tablet 1 tablet, oral, Nightly    vit C/E/Zn/coppr/lutein/zeaxan (PRESERVISION AREDS-2 ORAL) oral, 2 times daily          Objective   VS: There were no vitals taken for this visit.  Weight: Daily Weight  04/26/24 : 78.1 kg (172 lb 3.2 oz)  04/26/24 : 77.2 kg (170 lb 4.8 oz)  12/22/23 : 77.1 kg (170 lb)  12/22/23 : 77.6 kg (171 lb)  05/19/23 : 76.9 kg (169 lb 7 oz)  02/17/23 : 74.4 kg (164 lb)  02/16/23 : 74.6 kg (164 lb 7.4 oz)      Physical Exam  Constitutional:       Appearance: Normal appearance. She is well-developed.   HENT:      Head: Normocephalic and atraumatic.      Right Ear: External ear normal. No tenderness.      Left Ear: External ear normal. No tenderness.      Nose: Nose normal.      Mouth/Throat:      Mouth: No injury or oral lesions.      Tongue: No lesions.   Eyes:      Extraocular Movements: Extraocular movements intact.      Conjunctiva/sclera: Conjunctivae normal.      Pupils: Pupils are equal, round, and reactive to light.   Neck:      Thyroid: No thyroid mass.   Cardiovascular:      Rate and Rhythm: Normal rate and regular rhythm.      Pulses: Normal pulses.      Heart sounds: No murmur heard.     No gallop.   Pulmonary:      Effort: Pulmonary effort is normal. No respiratory distress.      Breath sounds: Normal breath sounds. No stridor. No wheezing.   Abdominal:      General: Bowel sounds are normal. There is no distension or abdominal bruit.      Palpations: Abdomen is soft.      Tenderness: There is no abdominal tenderness.   Musculoskeletal:         General: Normal range of motion.      Cervical back: Normal range of motion and neck supple. No signs of trauma. Normal range of motion.   Lymphadenopathy:      Upper Body:      Right upper body: No axillary adenopathy.      Left upper body: No axillary adenopathy.   Skin:     General: Skin is warm and dry.      Comments: No new skin lesions   Neurological:      General: No focal  "deficit present.      Mental Status: She is alert and oriented to person, place, and time.      Gait: Gait is intact.   Psychiatric:         Mood and Affect: Mood and affect normal.         Behavior: Behavior is cooperative.         Diagnostic Results   Labs  Below labs are reviewed today.  Results from last 7 days   Lab Units 04/26/24  0932   WBC AUTO x10*3/uL 4.5   HEMOGLOBIN g/dL 13.1   HEMATOCRIT % 40.5   PLATELETS AUTO x10*3/uL 168   NEUTROS ABS x10*3/uL 2.50   LYMPHS ABS AUTO x10*3/uL 1.35   MONOS ABS AUTO x10*3/uL 0.52   EOS ABS AUTO x10*3/uL 0.06   NEUTROS PCT AUTO % 56.3   LYMPHS PCT AUTO % 30.3   MONOS PCT AUTO % 11.7   EOS PCT AUTO % 1.3      Results from last 7 days   Lab Units 04/26/24  0932   GLUCOSE mg/dL 99   SODIUM mmol/L 141   POTASSIUM mmol/L 4.2   CHLORIDE mmol/L 103   CO2 mmol/L 30   BUN mg/dL 21   CREATININE mg/dL 0.74   EGFR mL/min/1.73m*2 86   CALCIUM mg/dL 9.8   ALBUMIN g/dL 4.6   PROTEIN TOTAL g/dL 6.8   BILIRUBIN TOTAL mg/dL 0.5   ALK PHOS U/L 104   ALT U/L 21   AST U/L 19     Results from last 7 days   Lab Units 04/26/24  0932   TSH mIU/L 3.16               Images       Pathology  Lab Results   Component Value Date    PATHREP  03/07/2022     Name OK GREENWOOD                                                                                                   Accession #: [id number]            Pathologist:                   WADAD S. MNEIMNEH, MD  Date of Procedure:    3/7/2022  Date Received:          3/7/2022  Date Reported           3/8/2022  Submitting Physician:   FARNAZ FINK MD  Location:                    [facility]  Other External #                                                                    FINAL DIAGNOSIS  \"LEFT LEVEL 2 NODE\", RESECTION:  -- EXTENSIVE NECROSIS WITH FIBROINFLAMMATORY AND XANTHOMATOUS REACTION (SEE  NOTE).  -- NO DEFINITE VIABLE TUMOR IDENTIFIED.    Note: The tissue is entirely submitted for examination. The nodule consists of  necrosis with " fibroinflammatory and xanthomatous reaction and cholesterol  clefts. Within the necrosis, there is occasional markedly degenerated  keratinous material. These findings most likely represent a previously replaced  lymph node by neoplasm with extensive/complete treatment response.  No viable  tumor is identified.                                                                                                                                                                                                                                                                                                                                                                                                                                                                                       Electronically Signed Out By WADAD S. MNEIMNEH, MD/WSTERE  By the signature on this report, the individual or group listed as making the  Final Interpretation/Diagnosis certifies that they have reviewed this case.         Intraoperative Consultation:  A: LEFT LEVEL 2 NODE ( RULE OUT CANCER)    Frozen Section 1:  Date Ordered: 3/7/2022 13:04     Date Received: 3/7/2022 13:04     Date Called:  3/7/2022 13:27    Intraoperative Diagnosis:  A.  Negative for malignancy.  B.  Necrotizing granuloma.  Intraoperative Consult Pathologist(s):  HIREN REESE MD (P)      aaa/3/7/2022           Clinical History:  tonsillar cancer, status post chemoradiation      Specimens Submitted As:  A: LEFT LEVEL 2 NODE ( RULE OUT CANCER)     Gross Description:  Received fresh for intraoperative consultation, labeled with the patient's name  and hospital number, is a 2.5 x 1.5 x 0.8 cm possible lymph node with attached  adipose tissue. The lymph node is serially sectioned. The entire lymph node was  submitted for intraoperative diagnosis and the tissue remnants in 2 cassettes,  A1 FSC-A2 FSC. The  remaining fibroadipose tissue is submitted for permanent in  one cassette,  A3. AAA/RL    aaa/3/7/2022               [facility]  Department of Pathology   [id number] [place] [address]        PATHREP  01/31/2022                                                     MRN: [id number]  Patient Name OK GREENWOOD  Accession #: [id number]  Date of Procedure:  1/31/2022       Date Reported: 2/2/2022  Date Received:  1/31/2022  Date of Birth / Sex 1950 (Age: 71) / F  Race: WHITE  Submitting Physician: FARNAZ FINK MD  Attending Physician: NABIL SINGH MD           Other External #          FINAL CYTOLOGICAL INTERPRETATION    A.  FINE NEEDLE ASPIRATION LEFT NECK LYMPH NODE, CYTOLOGY AND CELL BLOCK:       NONDIAGNOSTIC SPECIMEN DUE TO INSUFFICIENT CELLULARITY.        Interpretation performed at:  [facility]  Department of Pathology  [address] [place] [address]  Phone: [phone] Fax: [phone]      Slide(s) initially screened by a Cytotechnologist at [facility], [address] [place] [address]      Electronically Signed Out By COLTON VASQUEZ MD    By the signature on this report, the individual or group listed as making the  Final Interpretation/Diagnosis certifies that they have reviewed this case.  Slide(s) initially screened by a Cytotechnologist at [facility]     Clinical History      Physician Contact Number: [phone]  Clinical Diagnosis History ENLARGED LYMPH NODE   Source of Specimen  A: FINE NEEDLE ASPIRATION LEFT NECK LYMPH NODE     Specimen Submitted as:  A:  FINE NEEDLE ASPIRATION LEFT NECK LYMPH NODE        Pap stain Non-Gyn, Pap stain Non-Gyn, Pap stain Non-Gyn, Pap stain  Non-Gyn, Diff-Quik stain Non-Gyn, Diff-Quik stain Non-Gyn, Diff-Quik stain  Non-Gyn, Diff-Quik stain Non-Gyn, CELL BLOCK, H&E, Initial    Gross Description  A.  FINE NEEDLE ASPIRATION LEFT NECK LYMPH NODE:  RECEIVED 8 DIRECT SMEARS  (4  AIR-DRIED AND 4 ALCOHOL-FIXED) AND 30 cc OF PINK TINTED, CLEAR NEEDLE RINSE IN  CYTOLYT WITH FEW PARTICLES.                 [facility]  Department of Pathology  [address] [place] [address]             Assessment/Plan   ASSESSMENT  Ludmila Bermudez is a 74 y.o. female with hx of p16+ SCC of left tonsil with bilateral neck disease s/p concurrent chemoradiation with weekly cispl atin  6/8/ - 7/27/21, received 7 cycles of Cisplatin, 70 gy RT. Restaging PET/CT 10/28/21 showed very good response with possible residual metabolic activity in Level 3 & 4 nodes. Excisional biopsy 3/7/22 did not review evidence of metastatic disease.     # Left Tonsil SCC,  p16+  - 10/28/21 Post treatment PET/CT showed very good response with possible residual metabolic activity in Level 3 & 4 nodes.  - CT Neck 1/4/21 showed suspicious left cervical lymph node at level 2. FNA of left level 2 lymph node was biopsied on 1/31/22 however not enough tissues were obtained. Open biopsy on 3/7/22 with Dr. Ragsdale showed no malignancy.  - Had covid in January 2023 but is largely resolved, has a mild lingering cough.   - Patient is 2 year 4 month from last treatment, doing well overall. 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 Advise patient try Biotene gel and xylimelt for dry mouth atnight.   - 4/26/24: Patient continue to do very well 3 years from treatment. Chronic SE from chemoRT including Xerostomia, mild dysphagia are stable/improving.    # Peripheral Neuropathy  - Patient with ongoing neuropathy in b/l hands and feet. Worst in the left foot and is causing her to wake up at night. Only taking Gabapentin 300mg at bedtime, but is still symptomatic  - Increased to Gabapentin 300mg BID and is working well to control neuropathy. Encourage her to engage Acupuncture to help with peripheral neuropathy as well.    # Lymphedema  - Patient with muscle stiffness and some swelling in the neck. Left neck  "\"catches\" sometimes. Likely 2ry to scar tissue and lymphedema from radiation. Could benefit from lymphedema therapy.     # Low Back Pain  - 1.5 h/o LBP, onset after long road trips. Back pain occurs with muscle spasm and radiation to muscle of lumbar region. Finds some relief when leaning forward on grocery carts  - Could be arthritis vs spinal stenosis, given h/o cancer could also be mets.   - Patient scheduled to start PT on 6/1  - MRI Lumbar/Sacrum did not show any neoplasm related change, though does note narrowing of multilevel degenerative changes of the lumbar spine.  - Patient completed PT and continues with acupuncture for LBP which has been very helpful     # Left side of tongue pain: improved  - Lesion noted on left side of tongue, patient has had pain there since starting CRT. Previously thought it was d/t irritation from adjacent silver cavity filling, but pain persists after filling was  changed to plastic. Likely SE from radiation      # Xerostomia/dysgeusia  - Improving with acupuncture notes saliva production returning, seeing acupuncturist Q2wks  - Encourage patient to try Xylimelt again or Biotene oral balance gel to see if it can provide more lasting dry mouth relief at night.     # Eczema    - Red scaly rash on low back, belly, nape of neck, saw Derm, dx with Eczema and was prescribed topical steroids. Pt is not always compliant with this and she scratches at the rash. Advised patient to  follow derm recs     # Elevated Ferritin  - No family history of hemachromatosis. She is post menopausal and ferritin is <300, TSAT 26% (NOT >45%). Pt without s/s that would indicate hemochromatosis I.e fatigue, generalized pain. Liver enzymes WNL, no s/s of iron overload. As Ferritin is elevated but TSAT is normal, more likely the elevated ferritin is due to chronic inflammatory state.       PLAN  -- RTC in 6 mo with ENT/MedOnc on 10/15/24  -- Continue Gabapentin 300mg BID for left foot neuropathy. Can " increase to TID if needed.  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

## 2024-05-03 ENCOUNTER — TELEMEDICINE (OUTPATIENT)
Dept: HEMATOLOGY/ONCOLOGY | Facility: HOSPITAL | Age: 74
End: 2024-05-03
Payer: MEDICARE
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
## 2024-05-03 DIAGNOSIS — G62.0 PERIPHERAL NEUROPATHY DUE TO CHEMOTHERAPY (MULTI): ICD-10-CM

## 2024-05-03 DIAGNOSIS — I89.0 LYMPHEDEMA DUE TO AND NOT CONCURRENT WITH IONIZING RADIOTHERAPY: Primary | ICD-10-CM

## 2024-05-03 DIAGNOSIS — T45.1X5A PERIPHERAL NEUROPATHY DUE TO CHEMOTHERAPY (MULTI): ICD-10-CM

## 2024-05-03 DIAGNOSIS — K11.7 XEROSTOMIA DUE TO RADIOTHERAPY: ICD-10-CM

## 2024-05-03 DIAGNOSIS — C09.9 MALIGNANT NEOPLASM OF TONSIL (MULTI): ICD-10-CM
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
## 2024-05-03 PROCEDURE — 1159F MED LIST DOCD IN RCRD: CPT | Performed by: STUDENT IN AN ORGANIZED HEALTH CARE EDUCATION/TRAINING PROGRAM

## 2024-05-03 PROCEDURE — 1160F RVW MEDS BY RX/DR IN RCRD: CPT | Performed by: STUDENT IN AN ORGANIZED HEALTH CARE EDUCATION/TRAINING PROGRAM

## 2024-05-03 PROCEDURE — 1157F ADVNC CARE PLAN IN RCRD: CPT | Performed by: STUDENT IN AN ORGANIZED HEALTH CARE EDUCATION/TRAINING PROGRAM

## 2024-05-03 PROCEDURE — 99212 OFFICE O/P EST SF 10 MIN: CPT | Performed by: STUDENT IN AN ORGANIZED HEALTH CARE EDUCATION/TRAINING PROGRAM

## 2024-05-28 ENCOUNTER — ALLIED HEALTH (OUTPATIENT)
Dept: INTEGRATIVE MEDICINE | Facility: CLINIC | Age: 74
End: 2024-05-28
Payer: MEDICARE

## 2024-05-28 DIAGNOSIS — M54.2 NECK PAIN: ICD-10-CM

## 2024-05-28 DIAGNOSIS — M54.59 OTHER LOW BACK PAIN: Primary | ICD-10-CM

## 2024-05-28 PROCEDURE — 97810 ACUP 1/> WO ESTIM 1ST 15 MIN: CPT | Performed by: INTERNAL MEDICINE

## 2024-05-28 PROCEDURE — 97811 ACUP 1/> W/O ESTIM EA ADD 15: CPT | Performed by: INTERNAL MEDICINE

## 2024-05-28 NOTE — PROGRESS NOTES
Acupuncture Visit:     Subjective   Patient ID: Ludmila Bermudez is a 74 y.o. female who presents for Back Pain and Neck Pain  Pt reported that she will be starting lymphedema massage on June 5th for neck discomfort and stiffness.  Her back feels fatigued and she has to have heat on in the car seats.  2 weeks ago she had a heide horse in left inner thigh and right thigh down to her foot.  It resolved slowly with some rest and pickle juice.  It has occurred since.         Session Information  Is this acupuncture treatment being billed to the patient's insurance company: Yes  This is visit number: 1  The patient has a total number of visits of: 12  Name of Insurance Company: Medicare a/b  Name of Supervising Physician: MATHEW Lundberg  Visit Type: Follow-up visit         Review of Systems         Provider reviewed plan for the acupuncture session, precautions and contraindications. Patient/guardian/hospital staff has given consent to treat with full understanding of what to expect during the session. Before acupuncture began, provider explained to the patient to communicate at any time if the procedure was causing discomfort past their tolerance level. Patient agreed to advise acupuncturist. The acupuncturist counseled the patient on the risks of acupuncture treatment including pain, infection, bleeding, and no relief of pain. The patient was positioned comfortably. There was no evidence of infection at the site of needle insertions.    Objective   Physical Exam         Treatment Plan  Treatment Goals: Pain management  Pattern Differentiation: adrenal imbalance  Treatment Principle: regulate adrenals    Acupuncture Treatment  Patient Position: Lateral recumbent on a table  Needle Guage: 36 guage /.20/ Blue seirin, 40 guage /.16/ Red seirin  Body Points - Left: li 4, ub 62 gb 21, 12, sj 16, si 16  Body Points - Bilateral: HJJ L2,3,4, ub 22, 23, 24  Body Points - Right: si 3, lr 3  Other Techniques Utilized: TDP Lamp,  Topicals  Topicals Description: warming lotion orange frankinscense  TDP Lamp Descripton: victoriano men  Needle Count In: 20  Needle Count Out: 20  Needle Retention Time (min): 30 minutes  Total Face to Face Time (min): 25 minutes              Assessment/Plan

## 2024-05-30 ENCOUNTER — APPOINTMENT (OUTPATIENT)
Dept: INTEGRATIVE MEDICINE | Facility: CLINIC | Age: 74
End: 2024-05-30
Payer: MEDICARE

## 2024-06-05 ENCOUNTER — EVALUATION (OUTPATIENT)
Dept: OCCUPATIONAL THERAPY | Facility: CLINIC | Age: 74
End: 2024-06-05
Payer: MEDICARE

## 2024-06-05 DIAGNOSIS — C09.9 MALIGNANT NEOPLASM OF TONSIL (MULTI): ICD-10-CM

## 2024-06-05 DIAGNOSIS — M43.6 STIFFNESS OF NECK: ICD-10-CM

## 2024-06-05 DIAGNOSIS — I89.0 LYMPHEDEMA OF FACE: Primary | ICD-10-CM

## 2024-06-05 DIAGNOSIS — I89.0 LYMPHEDEMA DUE TO AND NOT CONCURRENT WITH IONIZING RADIOTHERAPY: ICD-10-CM
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
## 2024-06-05 DIAGNOSIS — I89.0 LYMPHEDEMA, NOT ELSEWHERE CLASSIFIED: ICD-10-CM

## 2024-06-05 DIAGNOSIS — M54.2 NECK PAIN ON LEFT SIDE: ICD-10-CM

## 2024-06-05 PROCEDURE — 97166 OT EVAL MOD COMPLEX 45 MIN: CPT | Mod: GO

## 2024-06-05 PROCEDURE — 97535 SELF CARE MNGMENT TRAINING: CPT | Mod: GO

## 2024-06-05 ASSESSMENT — ENCOUNTER SYMPTOMS
OCCASIONAL FEELINGS OF UNSTEADINESS: 1
LOSS OF SENSATION IN FEET: 1
DEPRESSION: 0

## 2024-06-05 ASSESSMENT — ACTIVITIES OF DAILY LIVING (ADL): HOME_MANAGEMENT_TIME_ENTRY: 15

## 2024-06-05 NOTE — PROGRESS NOTES
Occupational Therapy    Occupational Therapy Evaluation    Name: Ludmila Bermudez  MRN: [id number]  : 1950  Date: 2024  Visit #1    Time Calculation  Start Time: 0940  Stop Time: 1040  Time Calculation (min): 60 min    Assessment:    Pt presents to occupational therapy today with lymphedema of neck and face, L>R; right, limited neck ROM; pain; tissue texture changes.  Standardized testing and measures administered today, including LLIS, reveal that the patient has multiple impairments in body structures and functions, activity limitations, and participation restrictions.   The patient has personal factors and comorbidities that may serve as barriers affecting the plan of care, effects of cancer, treatments, distance to travel to OT appointments.   Skilled OT services are warranted in order to realize measurable change in the above outcome measures and achieve improvements in the patient's functional status and individual goals. The patient and her  verbalized understanding and is in agreement with all goals and plan of care.    Clinical Presentation: evolving with changing characteristics   Level of Complexity: moderate   Problems To Be Addressed: decreased ADL performance, decreased IADL performance, decreased play/leisure performance, decreased knowledge of precautions, decreased knowledge of HEP, edema, pain, decreased ROM/joint mobility, decreased strength, impaired sensation/sensibility and lymphedema.      Plan:    Pt will:   -demonstrate decreased swelling, tightness and softened tissue texture of neck and face  -demonstrate decreased/stable neck and face measurements   -demonstrate increased knowledge of lymphedema skin care precautions, especially prior radiated skin, to reduce the risk of infection and exacerbation  -demonstrate independence with self manual lymph drainage (MLD) to enhance the lymphatic flow and decongestion of face and neck lymphedema  -demonstrate understanding of  lymphatic compression techniques and rationale  -tolerate compression garments, wraps as appropriate, follow established wear schedule  -decrease tightness, discomfort, pain  -increase ease of neck ROM, strength as needed for functional activities  -demonstrate understanding and compliance of lymphedema exercise/activity precautions  -be independent with HEPs    Intervention plan include: vasopneumatic device , ADLs, compression bandaging , edema control , education/instruction , garment measuring/fitting , home program , IADLs, manual lymphatic drainage , manual therapy , therapeutic activities and therapeutic exercises.   Frequency and duration: 1-2 time(s) a week, for 8-12 weeks.   Potential to achieve rehab goals is good.     Plan of care was developed with input and agreement by the patient.     Subjective   Current Problem:  1. Lymphedema of face        2. Malignant neoplasm of tonsil (Multi)  Referral to Physical Therapy      3. Lymphedema due to and not concurrent with ionizing radiotherapy  Referral to Physical Therapy      4. Lymphedema, not elsewhere classified        5. Stiffness of neck        6. Neck pain on left side          General:    History of present illness. Pt presents to OT, with her  Gary, with  lymphedema of neck and face, L>R; right, limited neck ROM; pain; tissue texture changes.  Squamous cell carcinoma L tonsil ,bilateral neck disease' biopsy left tonsillar lesion 5/2021, concurrent chemoradiation 6/8 - 7/27/21, 7/2021 pt had PEG tube due to poor oral intake, hospitalized, thrombocytopenia; hospital admit 8/21 DVT R axillary vein; readmit 9/21 DVT LLE.   PEG removed 11/21; 3/7/22 excisional biopsy left lymph node, extensive necrosis with fibro-inflammatory and xanthomatous reaction, no definite viable tumor identified.  8/21 DVT R axillary vein  Pt receives accupuncture for dry mouth, pt able to eat with water, slow pace, effort to clear swallow.  Medical history: HLD,  "anxiety/depression per chart; left elbow radial head fx;   implant 3/2004.  Hand Dominance: Right     Living Environment  Pt lives with her  in 2 story house, 1-step in from front, ramp back of house.  Pt family supportive.  Pt lives in [place].  Pt walks without device, had cane after hospitalizations.  Pt manages ADLS, slower pace, neck stiffness.     Precautions:  Precautions  STEADI Fall Risk Score (The score of 4 or more indicates an increased risk of falling): 2     Pain Assessment:  Pain left neck, \"catches\" when turn to left  Discomfort 3/10     Outcome Measure  LLIS= 18    Objective   ROM  L neck tight, restricted with head turn to right.  Neck scar tight (mid neck to left lateral side below ear)  Strength  Pt reports fatigue, paces self  Sensation  Abn sensation neck, mid to left side  Neuropathy hands, feet  Head and neck edema:  Mild fullness left face, jaw  Fullness left jaw line  Full sub mental area  Swelling neck, above scar.  Neck tight, indurated from XRT (front neck to left side)  Pt reports swelling in mouth, left cheek.    Head and neck measurements cm  -Tragus to mental protuberance:  R= 13.5,    L= 13.7  -Tragus to mouth angle:   R= 9.3,   L= 9.6  -Mandibular angle to nasal wing:   R= 9.6   L= 9.5  -Mandibular angle to ext eye corner:  ----  -Mandibular angle to int eye corner  ----  -Mental protuberance to int eye corner:  ----  -Mandibular angle to mental protuberance:  R= 9.8,  L= 10.3    Neck circumference cm:  5 cm below chin= 36.3  8 cm below chin= 35.5  Base of neck= 34.4    Treatment  60 minutes    Evaluation 45    Self Care 15  Pt and her  instructed on function of lymphatic system, causes of lymphedema, lymphedema tx/CDT-complete decongestive therapy which includes skin care, manual lymph drainage (MLD), compression and exercise. NLN hand out issued.   OT instruct abdominal breathing to support trunk lymphatic circulation.             "

## 2024-06-06 ENCOUNTER — APPOINTMENT (OUTPATIENT)
Dept: INTEGRATIVE MEDICINE | Facility: CLINIC | Age: 74
End: 2024-06-06
Payer: MEDICARE

## 2024-06-06 ENCOUNTER — ALLIED HEALTH (OUTPATIENT)
Dept: INTEGRATIVE MEDICINE | Facility: CLINIC | Age: 74
End: 2024-06-06
Payer: MEDICARE

## 2024-06-06 DIAGNOSIS — M54.59 OTHER LOW BACK PAIN: Primary | ICD-10-CM

## 2024-06-06 PROCEDURE — 97810 ACUP 1/> WO ESTIM 1ST 15 MIN: CPT | Performed by: INTERNAL MEDICINE

## 2024-06-06 PROCEDURE — 97811 ACUP 1/> W/O ESTIM EA ADD 15: CPT | Performed by: INTERNAL MEDICINE

## 2024-06-06 NOTE — PROGRESS NOTES
Acupuncture Visit:     Subjective   Patient ID: Ludmila Bermudez is a 74 y.o. female who presents for Back Pain  PT with .  She shared that she had 1 cramp from inner arch of left foot into groin. She has hx of intermittent swelling on the top of that foot.  She wears support stockings and drinks pickle juice to navigate cramping.  She started lymph edema tx in her neck was helpful and saw immediate benefits.      She shared that she had benefits of feeling less back fatigue in back.  She had an instance where she tripped while working on some [employer] that she thinks might be a contributor to her right sided low back pain        Session Information  Is this acupuncture treatment being billed to the patient's insurance company: Yes  This is visit number: 2  The patient has a total number of visits of: 12  Name of Insurance Company: Medicare a/b  Name of Supervising Physician: MATHEW Lundberg  Visit Type: Follow-up visit         Review of Systems         Provider reviewed plan for the acupuncture session, precautions and contraindications. Patient/guardian/hospital staff has given consent to treat with full understanding of what to expect during the session. Before acupuncture began, provider explained to the patient to communicate at any time if the procedure was causing discomfort past their tolerance level. Patient agreed to advise acupuncturist. The acupuncturist counseled the patient on the risks of acupuncture treatment including pain, infection, bleeding, and no relief of pain. The patient was positioned comfortably. There was no evidence of infection at the site of needle insertions.    Objective   Physical Exam         Treatment Plan  Treatment Goals: Pain management  Pattern Differentiation: oketsu, adrenal imbalance  Treatment Principle: regulate adrenals and move qi and blood    Acupuncture Treatment  Needle Guage: 40 guage /.16/ Red seirin, 36 guage /.20/ Blue seirin, 32 guage /.25/ Purple  irin  Body Points: Without retention, With retention  Body Points - Left: k6, inner yin, si 16, gb 12, sj 16  Body Points - Bilateral: lr 3  Body Points - Right: elisha jackman, km adrenal x4, HJJ L1,2,3,4, ub 25, 23, 24, 22  Other Techniques Utilized: TDP Lamp, Topicals  Topicals Description: warming lotion orange frankinscense  TDP Lamp Descripton: victoriano men  Needle Count In: 20  Needle Count Out: 20  Needle Retention Time (min): 25 minutes  Total Face to Face Time (min): 25 minutes              Assessment/Plan

## 2024-06-09 PROBLEM — I89.0 LYMPHEDEMA, NOT ELSEWHERE CLASSIFIED: Status: ACTIVE | Noted: 2024-06-09

## 2024-06-09 PROBLEM — M54.2 NECK PAIN ON LEFT SIDE: Status: ACTIVE | Noted: 2024-06-09

## 2024-06-09 PROBLEM — I89.0 LYMPHEDEMA OF FACE: Status: ACTIVE | Noted: 2024-06-09

## 2024-06-09 PROBLEM — M43.6 STIFFNESS OF NECK: Status: ACTIVE | Noted: 2024-06-09

## 2024-06-11 ENCOUNTER — TREATMENT (OUTPATIENT)
Dept: OCCUPATIONAL THERAPY | Facility: CLINIC | Age: 74
End: 2024-06-11
Payer: MEDICARE

## 2024-06-11 DIAGNOSIS — I89.0 LYMPHEDEMA, NOT ELSEWHERE CLASSIFIED: ICD-10-CM

## 2024-06-11 DIAGNOSIS — M43.6 STIFFNESS OF NECK: ICD-10-CM

## 2024-06-11 DIAGNOSIS — I89.0 LYMPHEDEMA OF FACE: Primary | ICD-10-CM

## 2024-06-11 DIAGNOSIS — M54.2 NECK PAIN ON LEFT SIDE: ICD-10-CM

## 2024-06-11 PROCEDURE — 97535 SELF CARE MNGMENT TRAINING: CPT | Mod: GO | Performed by: OCCUPATIONAL THERAPIST

## 2024-06-11 ASSESSMENT — ACTIVITIES OF DAILY LIVING (ADL): HOME_MANAGEMENT_TIME_ENTRY: 59

## 2024-06-11 NOTE — PROGRESS NOTES
"Occupational Therapy    Occupational Therapy Evaluation    Name: Ludmila Bermudez  MRN: [id number]  : 1950  Date: 2024    Visit #2    Assessment:  OT initiated Manual Lymph Drainage sequence for head and neck swelling.  OT educated patient and spouse on correct hand techniques and sequencing; both expressed carryover.  Will further assess and refine.      Plan:  Continue plan of care as established in evaluation     Subjective   Current Problem:  1. Lymphedema of face        2. Lymphedema, not elsewhere classified        3. Neck pain on left side        4. Stiffness of neck          Pt reports no new changes since evaluation         Pain Assessment:  Pain left neck, \"catches\" when turn to left    Objective   ROM  L neck tight, restricted with head turn to right.  Neck scar tight (mid neck to left lateral side below ear)  Strength  Pt reports fatigue, paces self  Sensation  Abn sensation neck, mid to left side  Neuropathy hands, feet  Head and neck edema:  Mild fullness left face, jaw  Fullness left jaw line  Full sub mental area  Swelling neck, above scar.  Neck tight, indurated from XRT (front neck to left side)  Pt reports swelling in mouth, left cheek.    Head and neck measurements cm  No measurements taken on today's date     Treatment  59 minutes    Self Care: 59 minutes     OT initiated manual lymph drainage (MLD) home program for head and neck lymphedema. . OT provided rationale for MLD; OT utilized visual aids of the lymphatic system to reinforce education. OT provided patient with step by step personalized MLD sequence. OT educated pt on correct hand techniques and sequencing for self MLD.  Pt with good teach back of education provided.  OT educated spouse on correct techniques for caregiver assistance with HEP; pt's spouse, Gary, practiced hand techniques for MLD.  OT refined technique.        "

## 2024-06-13 ENCOUNTER — APPOINTMENT (OUTPATIENT)
Dept: INTEGRATIVE MEDICINE | Facility: CLINIC | Age: 74
End: 2024-06-13
Payer: MEDICARE

## 2024-06-13 DIAGNOSIS — M54.59 OTHER LOW BACK PAIN: Primary | ICD-10-CM

## 2024-06-13 DIAGNOSIS — M54.2 NECK PAIN: ICD-10-CM

## 2024-06-13 PROCEDURE — 97810 ACUP 1/> WO ESTIM 1ST 15 MIN: CPT | Performed by: INTERNAL MEDICINE

## 2024-06-13 PROCEDURE — 97811 ACUP 1/> W/O ESTIM EA ADD 15: CPT | Performed by: INTERNAL MEDICINE

## 2024-06-13 NOTE — PROGRESS NOTES
Acupuncture Visit:     Subjective   Patient ID: Ludmila Bermudez is a 74 y.o. female who presents for No chief complaint on file.  Pt reported that she had no heide horses and back did not have any discomfort.  Her neck caught a couple times.  She was very active cleaning gutters and gardening.        Session Information  This is visit number: 3  The patient has a total number of visits of: 12  Name of Insurance Company: Medicare a/b  Name of Supervising Physician: MATHEW Lundberg  Visit Type: Follow-up visit         Review of Systems         Provider reviewed plan for the acupuncture session, precautions and contraindications. Patient/guardian/hospital staff has given consent to treat with full understanding of what to expect during the session. Before acupuncture began, provider explained to the patient to communicate at any time if the procedure was causing discomfort past their tolerance level. Patient agreed to advise acupuncturist. The acupuncturist counseled the patient on the risks of acupuncture treatment including pain, infection, bleeding, and no relief of pain. The patient was positioned comfortably. There was no evidence of infection at the site of needle insertions.    Objective   Physical Exam         Treatment Plan  Treatment Goals: Pain management  Pattern Differentiation: oketsu, adrenal imbalance  Treatment Principle: regulate adrenals and move blood and qi    Acupuncture Treatment  Patient Position: Lateral recumbent on a table  Needle Guage: 40 guage /.16/ Red seirin, 36 guage /.20/ Blue seirin, 32 guage /.25/ Purple seirin  Body Points - Left: trochanterx2, gb 30, gb 21, si 15, bilao, east wind gb 12, li 4, gb 41  Body Points - Bilateral: 2nd meme, HJJ L2,3,4, ub 23, 24, lr 3  Body Points - Right: sj 5  Other Techniques Utilized: TDP Lamp, Topicals  Topicals Description: warming lotion orange frankinscense  TDP Lamp Descripton: victoriano men  Needle Count In: 25  Needle Count Out: 25  Needle Retention  Time (min): 25 minutes  Total Face to Face Time (min): 25 minutes              Assessment/Plan

## 2024-06-20 ENCOUNTER — APPOINTMENT (OUTPATIENT)
Dept: INTEGRATIVE MEDICINE | Facility: CLINIC | Age: 74
End: 2024-06-20
Payer: MEDICARE

## 2024-06-20 DIAGNOSIS — M54.2 NECK PAIN: ICD-10-CM

## 2024-06-20 DIAGNOSIS — M54.59 OTHER LOW BACK PAIN: Primary | ICD-10-CM

## 2024-06-20 PROCEDURE — 97810 ACUP 1/> WO ESTIM 1ST 15 MIN: CPT | Performed by: INTERNAL MEDICINE

## 2024-06-20 PROCEDURE — 97811 ACUP 1/> W/O ESTIM EA ADD 15: CPT | Performed by: INTERNAL MEDICINE

## 2024-06-20 NOTE — PROGRESS NOTES
Acupuncture Visit:     Subjective   Patient ID: Ludmila Bermudez is a 74 y.o. female who presents for No chief complaint on file.  Pt with .  Her back has been feeling good and they have been navigating heat by being in pool.  She experienced 1 instance where her neck caught.  On Friday her right leg felt week which resolved the following day.  On Saturday she had some cramping in her left lower extremity.          Session Information  Is this acupuncture treatment being billed to the patient's insurance company: Yes  This is visit number: 4  The patient has a total number of visits of: 12  Name of Insurance Company: Medicare a/b  Name of Supervising Physician: MATHEW Lundberg  Visit Type: Follow-up visit  Medical History Reviewed: I have reviewed pertinent medical history in EHR, and no contraindications are present to provide treatment         Review of Systems         Provider reviewed plan for the acupuncture session, precautions and contraindications. Patient/guardian/hospital staff has given consent to treat with full understanding of what to expect during the session. Before acupuncture began, provider explained to the patient to communicate at any time if the procedure was causing discomfort past their tolerance level. Patient agreed to advise acupuncturist. The acupuncturist counseled the patient on the risks of acupuncture treatment including pain, infection, bleeding, and no relief of pain. The patient was positioned comfortably. There was no evidence of infection at the site of needle insertions.    Objective   Physical Exam         Treatment Plan  Pattern Differentiation: oketsu adrenal imbalance  Treatment Principle: Regulate adrenals move qi and blood    Acupuncture Treatment  Patient Position: Lateral recumbent on a table  Needle Guage: 40 guage /.16/ Red seirin, 42 guage /.14/ Lime green seirin, 36 guage /.20/ Blue seirin, 32 guage /.25/ Purple seirin  Body Points - Left: si 3, i4  Body Points -  Bilateral: HJJ L2,3,4, 2nd meme, lr 3  Body Points - Right: km adrenal x2, trochanter x3, gb 30, , ub 62  Topicals Description: warming lotion orange frankinscense  TDP Lamp Descripton: victoriano men  Needle Count In: 19  Needle Count Out: 19  Needle Retention Time (min): 25 minutes  Total Face to Face Time (min): 25 minutes              Assessment/Plan

## 2024-06-27 ENCOUNTER — APPOINTMENT (OUTPATIENT)
Dept: INTEGRATIVE MEDICINE | Facility: CLINIC | Age: 74
End: 2024-06-27
Payer: MEDICARE

## 2024-06-27 ENCOUNTER — APPOINTMENT (OUTPATIENT)
Dept: OCCUPATIONAL THERAPY | Facility: CLINIC | Age: 74
End: 2024-06-27
Payer: MEDICARE

## 2024-06-27 DIAGNOSIS — M54.2 NECK PAIN: ICD-10-CM

## 2024-06-27 DIAGNOSIS — M54.59 OTHER LOW BACK PAIN: Primary | ICD-10-CM

## 2024-06-27 PROCEDURE — 97811 ACUP 1/> W/O ESTIM EA ADD 15: CPT | Performed by: INTERNAL MEDICINE

## 2024-06-27 PROCEDURE — 97810 ACUP 1/> WO ESTIM 1ST 15 MIN: CPT | Performed by: INTERNAL MEDICINE

## 2024-06-27 NOTE — PROGRESS NOTES
Acupuncture Visit:     Subjective   Patient ID: Ludmila Bermudez is a 74 y.o. female who presents for Back Pain and Neck Pain  Pt with . They have been mulching and weeding.  Her back has been doing well even with increased activity.  Weather permitting she walks in the pool.  Her neck has only caught 1x.  She feels intermittent onset of cramp but it resolves before manifesting.  She is now able to stretch without cramping.        Session Information  Is this acupuncture treatment being billed to the patient's insurance company: Yes  This is visit number: 5  The patient has a total number of visits of: 12  Name of Insurance Company: Medicare a/b  Name of Supervising Physician: MATHEW Lundberg  Visit Type: Follow-up visit         Review of Systems         Provider reviewed plan for the acupuncture session, precautions and contraindications. Patient/guardian/hospital staff has given consent to treat with full understanding of what to expect during the session. Before acupuncture began, provider explained to the patient to communicate at any time if the procedure was causing discomfort past their tolerance level. Patient agreed to advise acupuncturist. The acupuncturist counseled the patient on the risks of acupuncture treatment including pain, infection, bleeding, and no relief of pain. The patient was positioned comfortably. There was no evidence of infection at the site of needle insertions.    Objective   Physical Exam         Treatment Plan  Pattern Differentiation: oketsu and adrenal imbalance  Treatment Principle: move qi and blood, regulate adrenals    Acupuncture Treatment  Patient Position: Lateral recumbent on a table  Needle Guage: 40 guage /.16/ Red seirin, 42 guage /.14/ Lime green seirin, 36 guage /.20/ Blue seirin, 32 guage /.25/ Purple seirin  Body Points - Left: li4, sj 5, gb 41, gb 30, tan ovi slice x2, si 16, bilao, ub 10, gb 20, 12,  Body Points - Bilateral: lr 3, 2nd meme , ub 24, 23,  22,  Topicals Description: warming lotion orange frankinscense  TDP Lamp Descripton: victoriano men  Needle Count In: 21  Needle Count Out: 21  Needle Retention Time (min): 25 minutes  Total Face to Face Time (min): 25 minutes              Assessment/Plan

## 2024-07-01 ENCOUNTER — APPOINTMENT (OUTPATIENT)
Dept: INTEGRATIVE MEDICINE | Facility: CLINIC | Age: 74
End: 2024-07-01
Payer: MEDICARE

## 2024-07-01 DIAGNOSIS — M54.59 OTHER LOW BACK PAIN: Primary | ICD-10-CM

## 2024-07-01 DIAGNOSIS — M54.2 NECK PAIN: ICD-10-CM

## 2024-07-01 PROCEDURE — 97811 ACUP 1/> W/O ESTIM EA ADD 15: CPT | Performed by: INTERNAL MEDICINE

## 2024-07-01 PROCEDURE — 97810 ACUP 1/> WO ESTIM 1ST 15 MIN: CPT | Performed by: INTERNAL MEDICINE

## 2024-07-01 NOTE — PROGRESS NOTES
Acupuncture Visit:     Subjective   Patient ID: Ludmila Bermudez is a 74 y.o. female who presents for No chief complaint on file.  PT reported that she felt pretty good.  This morning was the first time she has a cramp.  She had mild and quick cramp in right  calf and foot.  She has had no catching in her neck or back pain.        Session Information  Is this acupuncture treatment being billed to the patient's insurance company: Yes  This is visit number: 6  The patient has a total number of visits of: 12  Name of Insurance Company: Medicare a/b  Name of Supervising Physician: MATHEW Lundberg  Visit Type: Follow-up visit         Review of Systems         Provider reviewed plan for the acupuncture session, precautions and contraindications. Patient/guardian/hospital staff has given consent to treat with full understanding of what to expect during the session. Before acupuncture began, provider explained to the patient to communicate at any time if the procedure was causing discomfort past their tolerance level. Patient agreed to advise acupuncturist. The acupuncturist counseled the patient on the risks of acupuncture treatment including pain, infection, bleeding, and no relief of pain. The patient was positioned comfortably. There was no evidence of infection at the site of needle insertions.    Objective   Physical Exam         Treatment Plan  Treatment Goals: Pain management  Pattern Differentiation: adrenal imbalance, oketsu  Treatment Principle: move qi and blood, regulate adrenals    Acupuncture Treatment  Patient Position: Supine on a table  Needle Guage: 42 guage /.14/ Lime green seirin, 40 guage /.16/ Red seirin, 36 guage /.20/ Blue seirin  Body Points: With retention  Body Points - Left: reginald bhatt back and neck pts, si 16, st 9, gb 12, east wind  Body Points - Bilateral: lr 3, li 4, k 6  Other Techniques Utilized: TDP Lamp, Topicals  Topicals Description: warming lotion orange frankinscense  TDP Lamp  Descripton: gary  Needle Count In: 13  Needle Count Out: 13  Needle Retention Time (min): 30 minutes  Total Face to Face Time (min): 25 minutes              Assessment/Plan        170.18

## 2024-07-02 ENCOUNTER — APPOINTMENT (OUTPATIENT)
Dept: INTEGRATIVE MEDICINE | Facility: CLINIC | Age: 74
End: 2024-07-02
Payer: MEDICARE

## 2024-07-03 ENCOUNTER — TREATMENT (OUTPATIENT)
Dept: OCCUPATIONAL THERAPY | Facility: CLINIC | Age: 74
End: 2024-07-03
Payer: MEDICARE

## 2024-07-03 DIAGNOSIS — M54.2 NECK PAIN ON LEFT SIDE: ICD-10-CM

## 2024-07-03 DIAGNOSIS — I89.0 LYMPHEDEMA OF FACE: Primary | ICD-10-CM

## 2024-07-03 DIAGNOSIS — M43.6 STIFFNESS OF NECK: ICD-10-CM

## 2024-07-03 DIAGNOSIS — I89.0 LYMPHEDEMA, NOT ELSEWHERE CLASSIFIED: ICD-10-CM

## 2024-07-03 PROCEDURE — 97535 SELF CARE MNGMENT TRAINING: CPT | Mod: GO

## 2024-07-03 PROCEDURE — 97110 THERAPEUTIC EXERCISES: CPT | Mod: GO

## 2024-07-03 PROCEDURE — 97140 MANUAL THERAPY 1/> REGIONS: CPT | Mod: GO

## 2024-07-03 ASSESSMENT — ACTIVITIES OF DAILY LIVING (ADL): HOME_MANAGEMENT_TIME_ENTRY: 15

## 2024-07-03 NOTE — PROGRESS NOTES
"Occupational Therapy    Occupational Therapy Treatment    Name: Ludmila Bermudez  MRN: [id number]  : 1950  Date: 24    Time Calculation  Start Time: 1102  Stop Time: 1147  Time Calculation (min): 45 min    Visit #3    Assessment:  OT provide Manual Lymph Drainage (MLD), review pt sequence and technique for head and neck swelling.   Tissue texture softening with tx.  Good teach back with refined techniques.  Instruction to pt and her .   Exercises instructed.     Plan:  Continue OT 1x/week for head and neck lymphedema, decrease swelling, soften tissue texture, improve mobility, instruct home management.    Subjective   Current Problem:  1. Lymphedema of face        2. Lymphedema, not elsewhere classified        3. Stiffness of neck        4. Neck pain on left side          Pt reports following MLD program.  Pt and her  note decreased swelling, though swelling returns.     Pain Assessment:  Pain left neck, \"catches\" when turn to left    Objective   ROM  L neck tight, restricted with head turn to right.  Neck scar tight (mid neck to left lateral side below ear)  Strength  Pt reports fatigue, paces self  Sensation  Abnorm sensation neck, mid to left side  Neuropathy hands, feet  Head and neck edema:  Mild fullness left face, jaw  Fullness left jaw line  Full sub mental area  Swelling neck, above scar.  Neck tight, indurated from XRT (front neck to left side)  Pt reports swelling in mouth, left cheek.    Head and neck measurements cm (compared to initial measurements 2024)  -Tragus to mental protuberance:  R=  13.3  (was 13.5),    L= 13.4  (was 13.7)  -Tragus to mouth angle:   R= 9.3  (was 9.3),   L= 8.6 (was 9.6)  -Mandibular angle to nasal wing:   R= 9.1 (was 9.6)   L= 9.1 (was 9.5)  -Mandibular angle to ext eye corner:  ----  -Mandibular angle to int eye corner  ----  -Mental protuberance to int eye corner:  ----  -Mandibular angle to mental protuberance:  R= 9.5 (was 9.8),  L= 9.7 (was " 10.3)    Neck circumference cm:  5 cm below chin= 35.7 (was 36.3)  8 cm below chin= 34.9 (was 35.5)  Base of neck= 33.9 (was 34.4)      Treatment  45 minutes    Manual Therapy 20  OT provider MLD trunk, neck, face sequence.  OT provide gentle soft tissue techniques left neck to pt tolerance, per prior radiated skin precautions.  Good tissue texture softening noted.    Self Care 15    OT assess skin, tissue texture, take and assess measurements.  OT review pt MLD HEP, refine hand techniques. Good teach back.  Instruction to pt and her .  OT write up ROM exercise HEP with pt input, issue.    Therapeutic Exercise 10  OT instruct ROM exercises:  Shoulder rolls, forward/backwards x5 each  Neck ROM, rotation, side bend, chin tuck x5 each  Scap pro-retractions x5, scaps tight, assist provided.   Rest breaks for all exercises instructed, exercise to pt tolerance.

## 2024-07-08 ENCOUNTER — APPOINTMENT (OUTPATIENT)
Dept: INTEGRATIVE MEDICINE | Facility: CLINIC | Age: 74
End: 2024-07-08
Payer: MEDICARE

## 2024-07-08 DIAGNOSIS — M54.59 OTHER LOW BACK PAIN: Primary | ICD-10-CM

## 2024-07-08 PROCEDURE — 97810 ACUP 1/> WO ESTIM 1ST 15 MIN: CPT | Performed by: ACUPUNCTURIST

## 2024-07-08 PROCEDURE — 97810 ACUP 1/> WO ESTIM 1ST 15 MIN: CPT | Performed by: INTERNAL MEDICINE

## 2024-07-08 PROCEDURE — 97811 ACUP 1/> W/O ESTIM EA ADD 15: CPT | Performed by: INTERNAL MEDICINE

## 2024-07-08 PROCEDURE — 97811 ACUP 1/> W/O ESTIM EA ADD 15: CPT | Performed by: ACUPUNCTURIST

## 2024-07-08 ASSESSMENT — ENCOUNTER SYMPTOMS: BACK PAIN: 1

## 2024-07-08 NOTE — PATIENT INSTRUCTIONS
Pt tolerated well,  she expressed her gratitude and deep understanding that she is loved.   confirmed that they will be reaching out to PCP regarding mood support today

## 2024-07-08 NOTE — PROGRESS NOTES
Acupuncture Visit:     Subjective   Patient ID: Ludmila Bermudez is a 74 y.o. female who presents for Back Pain  Pt with  who shared that wife is feeling down today.  She struggled with depression and anxiety prior to her DX and was treated with Rx.  She tapered down and is having a difficult few days.  She reported feeling down and side.  She confirmed that she has been in contact with provider to restart and/or connect regarding mood support.      She has had no cramping and had 1 day of back pain after moving heavy pots which resolved.    Previous:  PT reported that she felt pretty good.  This morning was the first time she has a cramp.  She had mild and quick cramp in right  calf and foot.  She has had no catching in her neck or back pain.         Back Pain                  Review of Systems   Musculoskeletal:  Positive for back pain.            Provider reviewed plan for the acupuncture session, precautions and contraindications. Patient/guardian/hospital staff has given consent to treat with full understanding of what to expect during the session. Before acupuncture began, provider explained to the patient to communicate at any time if the procedure was causing discomfort past their tolerance level. Patient agreed to advise acupuncturist. The acupuncturist counseled the patient on the risks of acupuncture treatment including pain, infection, bleeding, and no relief of pain. The patient was positioned comfortably. There was no evidence of infection at the site of needle insertions.    Objective   Physical Exam         Treatment Plan  Pattern Differentiation: adrenal imbalance  Treatment Principle: regulate adrenals    Acupuncture Treatment  Needle Guage: 42 guage /.14/ Lime green seirin, 40 guage /.16/ Red seirin  Body Points: With retention  Body Points - Bilateral: gb 41, sj 5, p6, bafeng liana  Other Techniques Utilized: Topicals  Topicals Description: warming lotion orange dawn  [facility] Lamp  Descripton: gary  Needle Count In: 18  Needle Count Out: 18  Needle Retention Time (min): 25 minutes  Total Face to Face Time (min): 25 minutes              Assessment/Plan

## 2024-07-09 ENCOUNTER — APPOINTMENT (OUTPATIENT)
Dept: INTEGRATIVE MEDICINE | Facility: CLINIC | Age: 74
End: 2024-07-09
Payer: MEDICARE

## 2024-07-09 ENCOUNTER — APPOINTMENT (OUTPATIENT)
Dept: OCCUPATIONAL THERAPY | Facility: CLINIC | Age: 74
End: 2024-07-09
Payer: MEDICARE

## 2024-07-16 ENCOUNTER — APPOINTMENT (OUTPATIENT)
Dept: INTEGRATIVE MEDICINE | Facility: CLINIC | Age: 74
End: 2024-07-16
Payer: MEDICARE

## 2024-07-16 ENCOUNTER — TREATMENT (OUTPATIENT)
Dept: OCCUPATIONAL THERAPY | Facility: CLINIC | Age: 74
End: 2024-07-16
Payer: MEDICARE

## 2024-07-16 DIAGNOSIS — M54.2 NECK PAIN ON LEFT SIDE: ICD-10-CM

## 2024-07-16 DIAGNOSIS — I89.0 LYMPHEDEMA OF FACE: Primary | ICD-10-CM

## 2024-07-16 DIAGNOSIS — I89.0 LYMPHEDEMA, NOT ELSEWHERE CLASSIFIED: ICD-10-CM

## 2024-07-16 DIAGNOSIS — M43.6 STIFFNESS OF NECK: ICD-10-CM

## 2024-07-16 PROCEDURE — 97140 MANUAL THERAPY 1/> REGIONS: CPT | Mod: GO

## 2024-07-16 PROCEDURE — 97110 THERAPEUTIC EXERCISES: CPT | Mod: GO

## 2024-07-16 NOTE — PROGRESS NOTES
"Occupational Therapy    Occupational Therapy Treatment    Name: Ludmila Bermudez  MRN: [id number]  : 1950  Date: 24    Time Calculation  Start Time: 901  Stop Time: 946  Time Calculation (min): 45 min    Visit #4    Assessment:  OT provide Manual Lymph Drainage (MLD), refine neck clearing.  OT instruct, upgrade ROM, mobility exercises, issue handout.  Pt report feeling looser after tx, though \"sore\".   Will assess, upgrade.       Plan:  Continue OT for head and neck lymphedema, decrease swelling, soften tissue texture, improve mobility, instruct home management.  See pt in 2 weeks.    Subjective   Current Problem:  1. Lymphedema of face        2. Lymphedema, not elsewhere classified        3. Stiffness of neck        4. Neck pain on left side          Pt reports following MLD program.  Pt and her  note decreased swelling, though swelling returns.     Pain Assessment:  Pain neck, \"catches\" when turn, both sides (neck from radiation).      Objective   ROM  L neck tight, restricted with head turn to right.  Neck scar tight (mid neck to left lateral side below ear)  Strength  Pt reports fatigue, paces self  Sensation  Abnorm sensation neck, mid to left side  Neuropathy hands, feet  Head and neck edema:  Mild fullness left face, jaw  Fullness left jaw line  Full sub mental area  Swelling neck, above scar.  Neck tight, indurated from XRT (front neck to left side)  Softened tissue texture noted.    Head and neck measurements cm  Measurements not taken.    Treatment  45 minutes    Manual Therapy 20  OT provider MLD trunk, neck, face sequence.  OT provide gentle soft tissue techniques left neck to pt tolerance, per prior radiated skin precautions.  OT demonstrate use of ant and post neck pathway for MLD drainage.  Caution with tight radiated skin, all to pt tolerance.  Soft tissue techniques to traps due to tightness.  Tissue texture softening and increased ease of mobility noted post tx.    Therapeutic " Exercise 25  OT review, refine ROM exercises:  -Shoulder rolls, forward/backwards x5 each  -Neck ROM, rotation, side bend, chin tuck x5 each  -Scap pro-retractions x5, scaps tight, assist provided.   OT instruct new exercises:  -shoulder ext rotation x5  -neck cervical extension x5  Add to written HEP  Rest breaks for all exercises instructed, exercise to pt tolerance.  SciFit arm bike seated x 1 minute  Finger ladder 1x each arm flex, abduct  -modifiy with towel slides on wall for home, add to written HEP.  OT pace to pt tolerance, seek pt feed back to ensure pt tolerance.  Will assess.

## 2024-07-18 ENCOUNTER — APPOINTMENT (OUTPATIENT)
Dept: INTEGRATIVE MEDICINE | Facility: CLINIC | Age: 74
End: 2024-07-18
Payer: MEDICARE

## 2024-07-18 DIAGNOSIS — M54.59 OTHER LOW BACK PAIN: Primary | ICD-10-CM

## 2024-07-18 PROCEDURE — 97811 ACUP 1/> W/O ESTIM EA ADD 15: CPT | Performed by: INTERNAL MEDICINE

## 2024-07-18 PROCEDURE — 97810 ACUP 1/> WO ESTIM 1ST 15 MIN: CPT | Performed by: INTERNAL MEDICINE

## 2024-07-18 ASSESSMENT — ENCOUNTER SYMPTOMS: BACK PAIN: 1

## 2024-07-18 NOTE — PROGRESS NOTES
Acupuncture Visit:     Subjective   Patient ID: Ludmila Bermudez is a 74 y.o. female who presents for Back Pain  Pt with .  They were able to connect with PCP who restarted one of her mood support medications.  Follow up in 4 weeks.  Both noted seeking supportive change with intermittent paranoia and depression.   noticed she get down on herself too.  Her back has been intermittently hurting her.  She applies heat and takes tylenol     Back Pain        Session Information  Is this acupuncture treatment being billed to the patient's insurance company: Yes  This is visit number: 7  The patient has a total number of visits of: 12  Name of Insurance Company: Medicare a/b  Name of Supervising Physician: MATHEW Lundberg  Visit Type: Follow-up visit         Review of Systems   Musculoskeletal:  Positive for back pain.            Provider reviewed plan for the acupuncture session, precautions and contraindications. Patient/guardian/hospital staff has given consent to treat with full understanding of what to expect during the session. Before acupuncture began, provider explained to the patient to communicate at any time if the procedure was causing discomfort past their tolerance level. Patient agreed to advise acupuncturist. The acupuncturist counseled the patient on the risks of acupuncture treatment including pain, infection, bleeding, and no relief of pain. The patient was positioned comfortably. There was no evidence of infection at the site of needle insertions.    Objective   Physical Exam    Acupuncture Physical Exam  Tongue Color: Red body  Tongue Shape: Yin cracks  Pulse: Weak    Treatment Plan  Treatment Goals: Pain management, Mood modification  Pattern Differentiation: adrenal imbalance, sugar imbalance  Treatment Principle: Regulate adrenal and sugar    Acupuncture Treatment  Body Points - Left: sp 4, k6  Body Points - Bilateral: bafeng, HJJL2,3,4,  Body Points - Right: maryann, elisha jackman, reginald  adrenalx4  Auricular Points: Yes  Auricular Points - Right: shenmen, autonomic  Topicals Description: warming lotion orange frankinscense  TDP Lamp Descripton: gary  Needle Count In: 28  Needle Count Out: 28  Needle Retention Time (min): 25 minutes  Total Face to Face Time (min): 25 minutes              Assessment/Plan

## 2024-07-23 ENCOUNTER — APPOINTMENT (OUTPATIENT)
Dept: OCCUPATIONAL THERAPY | Facility: CLINIC | Age: 74
End: 2024-07-23
Payer: MEDICARE

## 2024-07-23 ENCOUNTER — APPOINTMENT (OUTPATIENT)
Dept: INTEGRATIVE MEDICINE | Facility: CLINIC | Age: 74
End: 2024-07-23
Payer: MEDICARE

## 2024-07-25 ENCOUNTER — APPOINTMENT (OUTPATIENT)
Dept: INTEGRATIVE MEDICINE | Facility: CLINIC | Age: 74
End: 2024-07-25
Payer: MEDICARE

## 2024-07-30 ENCOUNTER — APPOINTMENT (OUTPATIENT)
Dept: INTEGRATIVE MEDICINE | Facility: CLINIC | Age: 74
End: 2024-07-30
Payer: MEDICARE

## 2024-07-30 ENCOUNTER — APPOINTMENT (OUTPATIENT)
Dept: OCCUPATIONAL THERAPY | Facility: CLINIC | Age: 74
End: 2024-07-30
Payer: MEDICARE

## 2024-08-01 ENCOUNTER — APPOINTMENT (OUTPATIENT)
Dept: INTEGRATIVE MEDICINE | Facility: CLINIC | Age: 74
End: 2024-08-01
Payer: MEDICARE

## 2024-08-06 ENCOUNTER — APPOINTMENT (OUTPATIENT)
Dept: INTEGRATIVE MEDICINE | Facility: CLINIC | Age: 74
End: 2024-08-06
Payer: MEDICARE

## 2024-08-08 ENCOUNTER — APPOINTMENT (OUTPATIENT)
Dept: INTEGRATIVE MEDICINE | Facility: CLINIC | Age: 74
End: 2024-08-08
Payer: MEDICARE

## 2024-08-13 ENCOUNTER — APPOINTMENT (OUTPATIENT)
Dept: INTEGRATIVE MEDICINE | Facility: CLINIC | Age: 74
End: 2024-08-13
Payer: MEDICARE

## 2024-08-15 ENCOUNTER — APPOINTMENT (OUTPATIENT)
Dept: INTEGRATIVE MEDICINE | Facility: CLINIC | Age: 74
End: 2024-08-15
Payer: MEDICARE

## 2024-08-15 DIAGNOSIS — M54.59 OTHER LOW BACK PAIN: Primary | ICD-10-CM

## 2024-08-15 PROCEDURE — 97811 ACUP 1/> W/O ESTIM EA ADD 15: CPT | Performed by: INTERNAL MEDICINE

## 2024-08-15 PROCEDURE — 97810 ACUP 1/> WO ESTIM 1ST 15 MIN: CPT | Performed by: INTERNAL MEDICINE

## 2024-08-15 ASSESSMENT — ENCOUNTER SYMPTOMS: BACK PAIN: 1

## 2024-08-15 NOTE — PROGRESS NOTES
Acupuncture Visit:     Subjective   Patient ID: Ludmila Bermudez is a 74 y.o. female who presents for Back Pain  Pt with .  Both of them had COVID for 2 weeks.  She reported that her back has been ok.  It started to hurt a few weeks ago but resolved with heat.  She did follow up with MD and increased dosage of rx.  She believes that it is helping but does experience some down times and paranoid behavior.   agreed that there is improvement but a ways to go.      Back Pain      Session Information  Is this acupuncture treatment being billed to the patient's insurance company: Yes  This is visit number: 8  The patient has a total number of visits of: 12  Name of Insurance Company: Medicare a/b  Name of Supervising Physician: MATHEW Lundberg  Visit Type: Follow-up visit         Review of Systems   Musculoskeletal:  Positive for back pain.            Provider reviewed plan for the acupuncture session, precautions and contraindications. Patient/guardian/hospital staff has given consent to treat with full understanding of what to expect during the session. Before acupuncture began, provider explained to the patient to communicate at any time if the procedure was causing discomfort past their tolerance level. Patient agreed to advise acupuncturist. The acupuncturist counseled the patient on the risks of acupuncture treatment including pain, infection, bleeding, and no relief of pain. The patient was positioned comfortably. There was no evidence of infection at the site of needle insertions.    Objective   Physical Exam    Acupuncture Physical Exam  Tongue Color: Red body  Tongue Coating: No coating    Treatment Plan  Treatment Goals: Pain management, Mood modification  Pattern Differentiation: adrenal imbalance  Treatment Principle: regulate adrenals    Acupuncture Treatment  Patient Position: Lateral recumbent on a table  Needle Guage: 40 guage /.16/ Red seirin, 36 guage /.20/ Blue seirin, 32 guage /.25/ Purple  sindy  Body Points: With retention  Body Points - Left: k6, du 20  Body Points - Bilateral: bafeng, ub 22,23,24, HJJ 2,3,4  Body Points - Right: km adrenal x3  Other Techniques Utilized: Topicals, TDP Lamp  Topicals Description: warming lotion orange frankinscense  TDP Lamp Descripton: victoriano men  Needle Count In: 25  Needle Count Out: 25  Needle Retention Time (min): 30 minutes  Total Face to Face Time (min): 25 minutes              Assessment/Plan

## 2024-08-20 ENCOUNTER — APPOINTMENT (OUTPATIENT)
Dept: HEMATOLOGY/ONCOLOGY | Facility: HOSPITAL | Age: 74
End: 2024-08-20
Payer: MEDICARE

## 2024-08-21 ENCOUNTER — APPOINTMENT (OUTPATIENT)
Dept: OCCUPATIONAL THERAPY | Facility: CLINIC | Age: 74
End: 2024-08-21
Payer: MEDICARE

## 2024-09-09 ENCOUNTER — DOCUMENTATION (OUTPATIENT)
Dept: OCCUPATIONAL THERAPY | Facility: CLINIC | Age: 74
End: 2024-09-09
Payer: MEDICARE

## 2024-09-09 NOTE — PROGRESS NOTES
Occupational Therapy    Pt cancelled last appointment 8/21/2024, reports she is managing her exercises, MLD at home.  Discharge OT.

## 2024-10-03 ENCOUNTER — APPOINTMENT (OUTPATIENT)
Dept: INTEGRATIVE MEDICINE | Facility: CLINIC | Age: 74
End: 2024-10-03
Payer: MEDICARE

## 2024-10-03 DIAGNOSIS — M54.59 OTHER LOW BACK PAIN: Primary | ICD-10-CM

## 2024-10-03 NOTE — PROGRESS NOTES
Acupuncture Visit:     Subjective   Patient ID: Ludmila Bermudez is a 74 y.o. female who presents for No chief complaint on file.  Pt with , she reports that her back has been doing well.        Session Information  Is this acupuncture treatment being billed to the patient's insurance company: Yes  This is visit number: 1  The patient has a total number of visits of: 8  Name of Insurance Company: Medicare a/b  Name of Supervising Physician: MATHEW Lundberg  Visit Type: Follow-up visit         Review of Systems         Provider reviewed plan for the acupuncture session, precautions and contraindications. Patient/guardian/hospital staff has given consent to treat with full understanding of what to expect during the session. Before acupuncture began, provider explained to the patient to communicate at any time if the procedure was causing discomfort past their tolerance level. Patient agreed to advise acupuncturist. The acupuncturist counseled the patient on the risks of acupuncture treatment including pain, infection, bleeding, and no relief of pain. The patient was positioned comfortably. There was no evidence of infection at the site of needle insertions.    Objective   Physical Exam         Treatment Plan  Treatment Goals: Pain management    Acupuncture Treatment  Patient Position: Supine on a table  Needle Guage: 40 guage /.16/ Red seirin, 42 guage /.14/ Lime green seirin, 36 guage /.20/ Blue seirin  Body Points - Bilateral: k6, gb41, sj5, st 25 cv 12  Body Points - Right: gb21, anteior delt x2  Other Techniques Utilized: Topicals, TDP Lamp  Topicals Description: warming lotion orange frankinscense  TDP Lamp Descripton: gary  Needle Count In: 12  Needle Count Out: 12  Needle Retention Time (min): 30 minutes  Total Face to Face Time (min): 25 minutes              Assessment/Plan

## 2024-10-10 ASSESSMENT — ENCOUNTER SYMPTOMS
HEADACHES: 0
LIGHT-HEADEDNESS: 0
NECK STIFFNESS: 1
ABDOMINAL PAIN: 0
COUGH: 0
LEG SWELLING: 0
CONSTIPATION: 0
BACK PAIN: 1
NAUSEA: 0
CHILLS: 0
FEVER: 0
VOMITING: 0
DIARRHEA: 0
SHORTNESS OF BREATH: 0
NUMBNESS: 1
TROUBLE SWALLOWING: 0

## 2024-10-10 NOTE — PROGRESS NOTES
Patient ID: Ludmila Bermudez is a 74 y.o. female.  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 Sabino Ragsdale  MedOnc: Dr. Kyunghee Burkitt, X, Tash Gonzalez ([place])  RadOnc: Dr. Akhil Raphael    Prior Therapy  6/8 - 7/27/21 Concurrent chemoradiation w/ 7 doses of Cisplatin (40mg/m2),  VMAT 70 gy in 35 fx     ONCOLOGIC HISTORY   - 2/2021: Started to have bilateral ear aches. She was treated and eventually ended up with ear aches only on the left side. Her throat continues to bother her, eventually developed a left-sided neck mass. Further evaluation showed a left tonsillar lesion.  - 4/30/2021: CT Neck showed bilateral neck metastasis especially on the left side (~6 cm)  - 5/2021: Biopsy of left tonsillar lesion showed p16+ SCC  - 6/8 - 7/27/21: Concurrent chemoradiation with weekly cisplatin (chemo held on 7/7 due to low platelet, but received total 7 treatments of weekly cisplatin-made up chemo was given on the last date of radiation)  - 10/28/21 Post treatment PET/CT showed very good response, but possible residual metabolic activity in Left level  3 and 4 nodes  - 1/4/22 CT Neck w/ contrast to f/u Left level 3 and 4 nodes, showed subtle irregularity of a left level 2 lymph node raising suspicion for neoplastic involvement.   - 1/31/22 FNA of Level 2 lymph node was non-diagnostic d/t insufficient cellularity  - 3/7/22 Underwent Excisional biopsy of Left level 2 node with Dr. Ragsdale. Path showing Extensive necrosis with fibro-inflammatory and xanthomatous reaction. No definite viable tumor identified.       Treatment complicated by:  - 7/31/21 Admitted due to worsening mucositis and poor oral intake. Prolonged hospitalization due to thrombocytopenia (most  likely ITP) and hematochezia. Thrombocytopenia is likely infection related vs meds side effects. She received Dexa 40mg 4/4 (8/8 - 8/11), two  sessions of IVIG ( - ), IVIG 0.4 mg/kg daily with platelets to follow (- ), s/p 2 doses of Nplate (  and )    - 21: DVT on right axillary vein  - GI bleed: seen by GI, likely secondary to low platelet count.  Per GI, low concern for UGIB.  - 21 Colonoscopy: biopsy of descending colon polyp showed tubular adenoma  -  - 21: Readmitted due to DVT in  LLE.  Started on Lovenox, but stopped due to platelets dropped. Work up for heparin induced thrombocytopenia is negative.  Started on Xarelto on 9/10/21 evening with close follow up with hematology.  - 21: Platelet 61. Taking Xarelto 15mg BID. Per hematology, decrease Xarelto to 15mg daily if platelet is low     PmHx: hx of left tonsil SCC, anxiety, depression, HLD  PsHx: radial head implant ()  FmHx: mother (colorectal cancer at age 67,  at age 94)  ScHx: lives with family, non-smoker, occasional alcohol use, retired  Allergies: Penicillins    Past Medical History:   No past medical history on file.   Surgical History:    Past Surgical History:   Procedure Laterality Date    OTHER SURGICAL HISTORY  2021    Elbow fracture repair      Family History:    Family History   Problem Relation Name Age of Onset    Rectal cancer Mother      Other (appendix disease) Father       Family Oncology History:    Cancer-related family history includes Rectal cancer in her mother.  Social History:    Social History     Tobacco Use    Smoking status: Never    Smokeless tobacco: Never          Subjective   Chief Complaint: Squamous Cell Carcinoma of left tonsil, follow up    HPI  Ludmila Bermudez is a 74 y.o. female with h/o p16+ SCC of left tonsil with bilateral neck disease s/p concurrent chemoradiation   - 21 with 7 doses of weekly cisplatin, 70gy RT.      Interval History   Patient presents today accompanied by her  for 3 year 3 months post treatment follow up.    Patient reports she's feeling very well. They're  welcoming another grandson next week.   She's sleeping much better since restarting on Wellbutrin and Olanzapine.  She has seen benefits from lymphedema therapy. Submental swelling is improved.  She's taking Gabapentin 300mg BID which is controlling her neuropathy well.   Still dry mouth that is managed with water. Denies dysphagia.   She continues acupuncture (Xena Duncan) for xerostomia, neck stiff, back pain, and neuropathy in her feet.   She's following a dentist Q6 mo.    ROS  Review of Systems   Constitutional:  Negative for chills and fever.   HENT:   Negative for hearing loss, lump/mass, mouth sores, nosebleeds, tinnitus and trouble swallowing.         Dry mouth   Respiratory:  Negative for cough and shortness of breath.    Cardiovascular:  Negative for chest pain and leg swelling.   Gastrointestinal:  Negative for abdominal pain, constipation, diarrhea, nausea and vomiting.   Musculoskeletal:  Positive for back pain and neck stiffness.   Skin:  Negative for rash.   Neurological:  Positive for numbness (Perpheral neuropathy in hands and feet). Negative for headaches and light-headedness.       Allergies  Allergies   Allergen Reactions    Penicillins Rash    Sulfamethoxazole Unknown     Feeling of numbness        Medications  Current Outpatient Medications   Medication Instructions    acetaminophen (TYLENOL ORAL) oral    buPROPion (WELLBUTRIN) 300 mg, oral, Once    cholecalciferol (Vitamin D3) 5,000 Units tablet oral, Daily    cyanocobalamin, vitamin B-12, (VITAMIN B-12 ORAL) 1,000 mcg, oral    gabapentin (NEURONTIN) 300 mg, oral, 3 times daily    Lipitor 20 mg tablet 1 tablet, oral, Nightly    OLANZapine (ZYPREXA) 5 mg, Nightly    vit C/E/Zn/coppr/lutein/zeaxan (PRESERVISION AREDS-2 ORAL) oral, 2 times daily          Objective   VS: /69 (BP Location: Left arm, Patient Position: Sitting, BP Cuff Size: Adult)   Pulse 87   Temp 36.8 °C (98.2 °F) (Temporal)   Resp 22   Wt 76.1 kg (167 lb 12.3  oz)   SpO2 100%   BMI 28.80 kg/m²   Weight: Daily Weight  10/15/24 : 76.5 kg (168 lb 9.6 oz)  10/15/24 : 76.1 kg (167 lb 12.3 oz)  04/26/24 : 78.1 kg (172 lb 3.2 oz)  04/26/24 : 77.2 kg (170 lb 4.8 oz)  12/22/23 : 77.1 kg (170 lb)  12/22/23 : 77.6 kg (171 lb)  05/19/23 : 76.9 kg (169 lb 7 oz)      Physical Exam  Constitutional:       Appearance: Normal appearance. She is well-developed.   HENT:      Head: Normocephalic and atraumatic.      Right Ear: External ear normal. No tenderness.      Left Ear: External ear normal. No tenderness.      Nose: Nose normal.      Mouth/Throat:      Mouth: No injury or oral lesions.      Tongue: No lesions.   Eyes:      Extraocular Movements: Extraocular movements intact.      Conjunctiva/sclera: Conjunctivae normal.      Pupils: Pupils are equal, round, and reactive to light.   Neck:      Thyroid: No thyroid mass.   Cardiovascular:      Rate and Rhythm: Normal rate and regular rhythm.      Pulses: Normal pulses.      Heart sounds: No murmur heard.     No gallop.   Pulmonary:      Effort: Pulmonary effort is normal. No respiratory distress.      Breath sounds: Normal breath sounds. No stridor. No wheezing.   Abdominal:      General: Bowel sounds are normal. There is no distension or abdominal bruit.      Palpations: Abdomen is soft.      Tenderness: There is no abdominal tenderness.   Musculoskeletal:         General: Normal range of motion.      Cervical back: Normal range of motion and neck supple. No signs of trauma. Normal range of motion.   Lymphadenopathy:      Upper Body:      Right upper body: No axillary adenopathy.      Left upper body: No axillary adenopathy.   Skin:     General: Skin is warm and dry.      Comments: No new skin lesions   Neurological:      General: No focal deficit present.      Mental Status: She is alert and oriented to person, place, and time.      Gait: Gait is intact.   Psychiatric:         Mood and Affect: Mood and affect normal.         Behavior:  "Behavior is cooperative.         Diagnostic Results   Labs  Below labs are reviewed today.  Results from last 7 days   Lab Units 10/15/24  1232   WBC AUTO x10*3/uL 5.6   HEMOGLOBIN g/dL 13.5   HEMATOCRIT % 41.3   PLATELETS AUTO x10*3/uL 174   NEUTROS ABS x10*3/uL 4.10   LYMPHS ABS AUTO x10*3/uL 0.92   MONOS ABS AUTO x10*3/uL 0.51   EOS ABS AUTO x10*3/uL 0.05   NEUTROS PCT AUTO % 72.9   LYMPHS PCT AUTO % 16.4   MONOS PCT AUTO % 9.1   EOS PCT AUTO % 0.9     Results from last 7 days   Lab Units 10/15/24  1232   GLUCOSE mg/dL 102*   SODIUM mmol/L 143   POTASSIUM mmol/L 4.5   CHLORIDE mmol/L 103   CO2 mmol/L 30   BUN mg/dL 17   CREATININE mg/dL 0.89   EGFR mL/min/1.73m*2 69   CALCIUM mg/dL 9.9   ALBUMIN g/dL 4.5   PROTEIN TOTAL g/dL 6.9   BILIRUBIN TOTAL mg/dL 0.5   ALK PHOS U/L 127   ALT U/L 29   AST U/L 21     Results from last 7 days   Lab Units 10/15/24  1232   TSH mIU/L 3.25               Images       Pathology  Lab Results   Component Value Date    PATHREP  03/07/2022     Name OK GREENWOOD                                                                                                   Accession #: [id number]            Pathologist:                   WADAD S. MNEIMNEH, MD  Date of Procedure:    3/7/2022  Date Received:          3/7/2022  Date Reported           3/8/2022  Submitting Physician:   FARNAZ FINK MD  Location:                    OR  Other External #                                                                    FINAL DIAGNOSIS  \"LEFT LEVEL 2 NODE\", RESECTION:  -- EXTENSIVE NECROSIS WITH FIBROINFLAMMATORY AND XANTHOMATOUS REACTION (SEE  NOTE).  -- NO DEFINITE VIABLE TUMOR IDENTIFIED.    Note: The tissue is entirely submitted for examination. The nodule consists of  necrosis with fibroinflammatory and xanthomatous reaction and cholesterol  clefts. Within the necrosis, there is occasional markedly degenerated  keratinous material. These findings most likely represent a previously " replaced  lymph node by neoplasm with extensive/complete treatment response.  No viable  tumor is identified.                                                                                                                                                                                                                                                                                                                                                                                                                                                                                       Electronically Signed Out By WADAD S. MNEIMNEH, MD/HORTENSIA  By the signature on this report, the individual or group listed as making the  Final Interpretation/Diagnosis certifies that they have reviewed this case.         Intraoperative Consultation:  A: LEFT LEVEL 2 NODE ( RULE OUT CANCER)    Frozen Section 1:  Date Ordered: 3/7/2022 13:04     Date Received: 3/7/2022 13:04     Date Called:  3/7/2022 13:27    Intraoperative Diagnosis:  A.  Negative for malignancy.  B.  Necrotizing granuloma.  Intraoperative Consult Pathologist(s):  HIREN REESE MD (P)      aaa/3/7/2022           Clinical History:  tonsillar cancer, status post chemoradiation      Specimens Submitted As:  A: LEFT LEVEL 2 NODE ( RULE OUT CANCER)     Gross Description:  Received fresh for intraoperative consultation, labeled with the patient's name  and hospital number, is a 2.5 x 1.5 x 0.8 cm possible lymph node with attached  adipose tissue. The lymph node is serially sectioned. The entire lymph node was  submitted for intraoperative diagnosis and the tissue remnants in 2 cassettes,  A1 FSC-A2 FSC. The  remaining fibroadipose tissue is submitted for permanent in  one cassette, A3. AAA/RL    aaa/3/7/2022               [facility]  Department of Pathology   [address] [place] [address]        PATHREP  01/31/2022                                                      MRN: [id number]  Patient Name OK GREENWOOD  Accession #: [id number]  Date of Procedure:  1/31/2022       Date Reported: 2/2/2022  Date Received:  1/31/2022  Date of Birth / Sex 1950 (Age: 71) / F  Race: WHITE  Submitting Physician: FARNAZ FINK MD  Attending Physician: NABIL SINGH MD           Other External #          FINAL CYTOLOGICAL INTERPRETATION    A.  FINE NEEDLE ASPIRATION LEFT NECK LYMPH NODE, CYTOLOGY AND CELL BLOCK:       NONDIAGNOSTIC SPECIMEN DUE TO INSUFFICIENT CELLULARITY.        Interpretation performed at:  [facility]  Department of Pathology  [address] [place] [address]  Phone: [phone] Fax: [phone]      Slide(s) initially screened by a Cytotechnologist at [facility], [address] [place] [address]      Electronically Signed Out By COLTON VASQUEZ MD    By the signature on this report, the individual or group listed as making the  Final Interpretation/Diagnosis certifies that they have reviewed this case.  Slide(s) initially screened by a Cytotechnologist at [facility]     Clinical History      Physician Contact Number: [phone]  Clinical Diagnosis History ENLARGED LYMPH NODE   Source of Specimen  A: FINE NEEDLE ASPIRATION LEFT NECK LYMPH NODE     Specimen Submitted as:  A:  FINE NEEDLE ASPIRATION LEFT NECK LYMPH NODE        Pap stain Non-Gyn, Pap stain Non-Gyn, Pap stain Non-Gyn, Pap stain  Non-Gyn, Diff-Quik stain Non-Gyn, Diff-Quik stain Non-Gyn, Diff-Quik stain  Non-Gyn, Diff-Quik stain Non-Gyn, CELL BLOCK, H&E, Initial    Gross Description  A.  FINE NEEDLE ASPIRATION LEFT NECK LYMPH NODE:  RECEIVED 8 DIRECT SMEARS (4  AIR-DRIED AND 4 ALCOHOL-FIXED) AND 30 cc OF PINK TINTED, CLEAR NEEDLE RINSE IN  CYTOLYT WITH FEW PARTICLES.                 [facility]  Department of Pathology  [address] [place] [address]  "[place] [phone]             Assessment/Plan   ASSESSMENT  Ludmila Bermudez is a 74 y.o. female with hx of p16+ SCC of left tonsil with bilateral neck disease s/p concurrent chemoradiation with weekly cispl atin  6/8/ - 7/27/21, received 7 cycles of Cisplatin, 70 gy RT. Restaging PET/CT 10/28/21 showed very good response with possible residual metabolic activity in Level 3 & 4 nodes. Excisional biopsy 3/7/22 did not review evidence of metastatic disease.     # Left Tonsil SCC,  p16+  - 10/28/21 Post treatment PET/CT showed very good response with possible residual metabolic activity in Level 3 & 4 nodes.  - CT Neck 1/4/21 showed suspicious left cervical lymph node at level 2. FNA of left level 2 lymph node was biopsied on 1/31/22 however not enough tissues were obtained. Open biopsy on 3/7/22 with Dr. Ragsdale showed no malignancy.  - Had covid in January 2023 but is largely resolved, has a mild lingering cough.   - Patient is 2 year 4 month from last treatment, doing well overall. 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 Advise patient try Biotene gel and xylimelt for dry mouth atnight.   - 10/15/24 Patient continue to do very well 3 years 6 months from treatment. Chronic SE from chemoRT including Xerostomia, mild dysphagia are stable/improving. Sleeping better as she restarted Wellbutrin and Olanzapine.     # Peripheral Neuropathy  - Patient with ongoing neuropathy in b/l hands and feet. Worst in the left foot and is causing her to wake up at night. Only taking Gabapentin 300mg at bedtime, but is still symptomatic  - Increased to Gabapentin 300mg BID and is working well to control neuropathy. Encourage her to engage Acupuncture to help with peripheral neuropathy as well.    # Lymphedema  - Patient with muscle stiffness and some swelling in the neck. Left neck \"catches\" sometimes. Likely 2ry to scar tissue and lymphedema from radiation. Could benefit from lymphedema therapy.     # Low Back Pain  - " 1.5 h/o LBP, onset after long road trips. Back pain occurs with muscle spasm and radiation to muscle of lumbar region. Finds some relief when leaning forward on grocery carts  - Could be arthritis vs spinal stenosis, given h/o cancer could also be mets.   - Patient scheduled to start PT on 6/1  - MRI Lumbar/Sacrum did not show any neoplasm related change, though does note narrowing of multilevel degenerative changes of the lumbar spine.  - Patient completed PT and continues with acupuncture for LBP which has been very helpful     # Left side of tongue pain: improved  - Lesion noted on left side of tongue, patient has had pain there since starting CRT. Previously thought it was d/t irritation from adjacent silver cavity filling, but pain persists after filling was  changed to plastic. Likely SE from radiation      # Xerostomia/dysgeusia  - Improving with acupuncture notes saliva production returning, seeing acupuncturist Q2wks  - Encourage patient to try Xylimelt again or Biotene oral balance gel to see if it can provide more lasting dry mouth relief at night.     # Eczema    - Red scaly rash on low back, belly, nape of neck, saw Derm, dx with Eczema and was prescribed topical steroids. Pt is not always compliant with this and she scratches at the rash. Advised patient to  follow derm recs     # Elevated Ferritin  - No family history of hemachromatosis. She is post menopausal and ferritin is <300, TSAT 26% (NOT >45%). Pt without s/s that would indicate hemochromatosis I.e fatigue, generalized pain. Liver enzymes WNL, no s/s of iron overload. As Ferritin is elevated but TSAT is normal, more likely the elevated ferritin is due to chronic inflammatory state.       PLAN  -- RTC in 6 mo with ENT/MedOnc on 4/1/24, labs cbc, cmp, tsh/t4  -- Continue Gabapentin 300mg BID for left foot neuropathy. Can increase to TID if needed.  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

## 2024-10-14 ENCOUNTER — APPOINTMENT (OUTPATIENT)
Dept: INTEGRATIVE MEDICINE | Facility: CLINIC | Age: 74
End: 2024-10-14
Payer: MEDICARE

## 2024-10-14 DIAGNOSIS — M54.59 OTHER LOW BACK PAIN: Primary | ICD-10-CM

## 2024-10-14 PROCEDURE — 97811 ACUP 1/> W/O ESTIM EA ADD 15: CPT | Performed by: INTERNAL MEDICINE

## 2024-10-14 PROCEDURE — 97810 ACUP 1/> WO ESTIM 1ST 15 MIN: CPT | Performed by: INTERNAL MEDICINE

## 2024-10-14 NOTE — PROGRESS NOTES
Acupuncture Visit:     Subjective   Patient ID: Ludmila Bermudez is a 74 y.o. female who presents for No chief complaint on file.  PT reported her low back pain is intermittent and triggered by standing.  It resolves with rest.        Session Information  Is this acupuncture treatment being billed to the patient's insurance company: Yes  This is visit number: 2  The patient has a total number of visits of: 8  Name of Insurance Company: Medicare a/b  Name of Supervising Physician: MATHEW Lundberg  Visit Type: Follow-up visit  Medical History Reviewed: I have reviewed pertinent medical history in EHR, and no contraindications are present to provide treatment         Review of Systems         Provider reviewed plan for the acupuncture session, precautions and contraindications. Patient/guardian/hospital staff has given consent to treat with full understanding of what to expect during the session. Before acupuncture began, provider explained to the patient to communicate at any time if the procedure was causing discomfort past their tolerance level. Patient agreed to advise acupuncturist. The acupuncturist counseled the patient on the risks of acupuncture treatment including pain, infection, bleeding, and no relief of pain. The patient was positioned comfortably. There was no evidence of infection at the site of needle insertions.    Objective   Physical Exam              Acupuncture Treatment  Needle Guage: 42 guage /.14/ Lime green seirin, 36 guage /.20/ Blue seirin  Body Points - Left: ana luisa 5, lr 4, gb 21, si 16, gb 12,20  Body Points - Bilateral: sp4, k6, immunex2  Needle Count In: 14  Needle Count Out: 14  Needle Retention Time (min): 30 minutes  Total Face to Face Time (min): 30 minutes              Assessment/Plan

## 2024-10-14 NOTE — PROGRESS NOTES
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 She received a combination of chemotherapy and radiation. I cannot appreciate any recurrent tumor.       Minimal dysphagia.    I will see her in 6 months.      Chief Complaint     Follow-up regarding the management of a left tonsillar cancer.      History of Present Illness    This lady was seen in May 2021 at the request of a local colleague. Back in February 2020 when she started complaining of bilateral earaches. She was treated and eventually ended up with earaches only on the left side. Her throat continues to bother her. She eventually developed a left-sided neck mass. This led to further evaluation which showed a left tonsillar lesion. This was biopsied and consistent with a p16 positive squamous cell carcinoma. She also had a CT scan of her neck that shows bilateral neck metastasis especially on the left side. She received a combination of chemotherapy and radiation. She finished on July 27, 2021. She really has had all sorts of issues during her treatments and ended up being in the hospital for 27 days. She had a PET scan done on October 28, 2021. I personally reviewed that scan that shows an uptake in the left a level 2. On January 4, 2022 she had a CT scan that I personally reviewed that shows there is a 1 cm node in the left level 2 that was described as suspicious by the radiologist. She was sent for an ultrasound-guided needle aspirate. Unfortunately this was not diagnostic. She was eventually brought to the operating room on March 7, 2022. The lymph node was removed. It did not show any cancer. She does have some limitation in swallowing because of dryness. She denies any pain or discomfort. She had a chest x-ray in April 2024 that was negative. She had a TSH level in April 2024 which was normal.     Physical Exam        Examination of the oral cavity and oropharynx shows evidence of dryness but no evidence of any suspicious lesions. There is good mobility of the tongue and palate. There is good mandibular excursion. Palpation of the parotid, neck, and thyroid field is negative for any masses or adenopathies.  Her left neck is somewhat indurated.     A flexible laryngoscopy was carried out. Under topical Xylocaine and Moiz-Synephrine the scope was introduced through the nostril. The nasopharynx, base of tongue, hypopharynx, and larynx are visualized. The vocal cords are normally mobile.  Minimal puffiness and no retained secretions.

## 2024-10-15 ENCOUNTER — OFFICE VISIT (OUTPATIENT)
Dept: HEMATOLOGY/ONCOLOGY | Facility: HOSPITAL | Age: 74
End: 2024-10-15
Payer: MEDICARE

## 2024-10-15 ENCOUNTER — LAB (OUTPATIENT)
Dept: LAB | Facility: HOSPITAL | Age: 74
End: 2024-10-15
Payer: MEDICARE

## 2024-10-15 ENCOUNTER — OFFICE VISIT (OUTPATIENT)
Dept: OTOLARYNGOLOGY | Facility: HOSPITAL | Age: 74
End: 2024-10-15
Payer: MEDICARE

## 2024-10-15 VITALS
HEART RATE: 87 BPM | TEMPERATURE: 98.2 F | WEIGHT: 167.77 LBS | SYSTOLIC BLOOD PRESSURE: 155 MMHG | BODY MASS INDEX: 28.8 KG/M2 | RESPIRATION RATE: 22 BRPM | DIASTOLIC BLOOD PRESSURE: 69 MMHG | OXYGEN SATURATION: 100 %

## 2024-10-15 VITALS — BODY MASS INDEX: 28.79 KG/M2 | TEMPERATURE: 97.5 F | WEIGHT: 168.6 LBS | HEIGHT: 64 IN

## 2024-10-15 DIAGNOSIS — Z08 ENCOUNTER FOR FOLLOW-UP SURVEILLANCE OF HEAD AND NECK CANCER: ICD-10-CM

## 2024-10-15 DIAGNOSIS — K11.7 XEROSTOMIA DUE TO RADIOTHERAPY: ICD-10-CM
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
## 2024-10-15 DIAGNOSIS — I10 HYPERTENSION, UNSPECIFIED TYPE: ICD-10-CM

## 2024-10-15 DIAGNOSIS — C09.9 MALIGNANT NEOPLASM OF TONSIL: ICD-10-CM

## 2024-10-15 DIAGNOSIS — I89.0 LYMPHEDEMA DUE TO AND NOT CONCURRENT WITH IONIZING RADIOTHERAPY: ICD-10-CM
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
## 2024-10-15 DIAGNOSIS — Z85.89 ENCOUNTER FOR FOLLOW-UP SURVEILLANCE OF HEAD AND NECK CANCER: ICD-10-CM

## 2024-10-15 DIAGNOSIS — E03.2 HYPOTHYROIDISM DUE TO MEDICATION: ICD-10-CM

## 2024-10-15 DIAGNOSIS — C09.9 MALIGNANT NEOPLASM OF TONSIL: Primary | ICD-10-CM

## 2024-10-15 DIAGNOSIS — G62.0 PERIPHERAL NEUROPATHY DUE TO CHEMOTHERAPY (MULTI): ICD-10-CM

## 2024-10-15 DIAGNOSIS — R13.12 DYSPHAGIA, OROPHARYNGEAL PHASE: Primary | ICD-10-CM

## 2024-10-15 DIAGNOSIS — T45.1X5A PERIPHERAL NEUROPATHY DUE TO CHEMOTHERAPY (MULTI): ICD-10-CM

## 2024-10-15 LAB
ALBUMIN SERPL BCP-MCNC: 4.5 G/DL (ref 3.4–5)
ALP SERPL-CCNC: 127 U/L (ref 33–136)
ALT SERPL W P-5'-P-CCNC: 29 U/L (ref 7–45)
ANION GAP SERPL CALC-SCNC: 15 MMOL/L (ref 10–20)
AST SERPL W P-5'-P-CCNC: 21 U/L (ref 9–39)
BASOPHILS # BLD AUTO: 0.03 X10*3/UL (ref 0–0.1)
BASOPHILS NFR BLD AUTO: 0.5 %
BILIRUB SERPL-MCNC: 0.5 MG/DL (ref 0–1.2)
BUN SERPL-MCNC: 17 MG/DL (ref 6–23)
CALCIUM SERPL-MCNC: 9.9 MG/DL (ref 8.6–10.3)
CHLORIDE SERPL-SCNC: 103 MMOL/L (ref 98–107)
CO2 SERPL-SCNC: 30 MMOL/L (ref 21–32)
CREAT SERPL-MCNC: 0.89 MG/DL (ref 0.5–1.05)
EGFRCR SERPLBLD CKD-EPI 2021: 69 ML/MIN/1.73M*2
EOSINOPHIL # BLD AUTO: 0.05 X10*3/UL (ref 0–0.4)
EOSINOPHIL NFR BLD AUTO: 0.9 %
ERYTHROCYTE [DISTWIDTH] IN BLOOD BY AUTOMATED COUNT: 12.2 % (ref 11.5–14.5)
GLUCOSE SERPL-MCNC: 102 MG/DL (ref 74–99)
HCT VFR BLD AUTO: 41.3 % (ref 36–46)
HGB BLD-MCNC: 13.5 G/DL (ref 12–16)
IMM GRANULOCYTES # BLD AUTO: 0.01 X10*3/UL (ref 0–0.5)
IMM GRANULOCYTES NFR BLD AUTO: 0.2 % (ref 0–0.9)
LYMPHOCYTES # BLD AUTO: 0.92 X10*3/UL (ref 0.8–3)
LYMPHOCYTES NFR BLD AUTO: 16.4 %
MCH RBC QN AUTO: 32.9 PG (ref 26–34)
MCHC RBC AUTO-ENTMCNC: 32.7 G/DL (ref 32–36)
MCV RBC AUTO: 101 FL (ref 80–100)
MONOCYTES # BLD AUTO: 0.51 X10*3/UL (ref 0.05–0.8)
MONOCYTES NFR BLD AUTO: 9.1 %
NEUTROPHILS # BLD AUTO: 4.1 X10*3/UL (ref 1.6–5.5)
NEUTROPHILS NFR BLD AUTO: 72.9 %
NRBC BLD-RTO: 0 /100 WBCS (ref 0–0)
PLATELET # BLD AUTO: 174 X10*3/UL (ref 150–450)
POTASSIUM SERPL-SCNC: 4.5 MMOL/L (ref 3.5–5.3)
PROT SERPL-MCNC: 6.9 G/DL (ref 6.4–8.2)
RBC # BLD AUTO: 4.1 X10*6/UL (ref 4–5.2)
SODIUM SERPL-SCNC: 143 MMOL/L (ref 136–145)
TSH SERPL-ACNC: 3.25 MIU/L (ref 0.44–3.98)
WBC # BLD AUTO: 5.6 X10*3/UL (ref 4.4–11.3)

## 2024-10-15 PROCEDURE — 1157F ADVNC CARE PLAN IN RCRD: CPT | Performed by: STUDENT IN AN ORGANIZED HEALTH CARE EDUCATION/TRAINING PROGRAM

## 2024-10-15 PROCEDURE — 99213 OFFICE O/P EST LOW 20 MIN: CPT | Performed by: OTOLARYNGOLOGY

## 2024-10-15 PROCEDURE — 3008F BODY MASS INDEX DOCD: CPT | Performed by: OTOLARYNGOLOGY

## 2024-10-15 PROCEDURE — 1159F MED LIST DOCD IN RCRD: CPT | Performed by: OTOLARYNGOLOGY

## 2024-10-15 PROCEDURE — 1036F TOBACCO NON-USER: CPT | Performed by: OTOLARYNGOLOGY

## 2024-10-15 PROCEDURE — 1159F MED LIST DOCD IN RCRD: CPT | Performed by: STUDENT IN AN ORGANIZED HEALTH CARE EDUCATION/TRAINING PROGRAM

## 2024-10-15 PROCEDURE — 80053 COMPREHEN METABOLIC PANEL: CPT

## 2024-10-15 PROCEDURE — 1160F RVW MEDS BY RX/DR IN RCRD: CPT | Performed by: OTOLARYNGOLOGY

## 2024-10-15 PROCEDURE — 1126F AMNT PAIN NOTED NONE PRSNT: CPT | Performed by: STUDENT IN AN ORGANIZED HEALTH CARE EDUCATION/TRAINING PROGRAM

## 2024-10-15 PROCEDURE — 1160F RVW MEDS BY RX/DR IN RCRD: CPT | Performed by: STUDENT IN AN ORGANIZED HEALTH CARE EDUCATION/TRAINING PROGRAM

## 2024-10-15 PROCEDURE — 31575 DIAGNOSTIC LARYNGOSCOPY: CPT | Performed by: OTOLARYNGOLOGY

## 2024-10-15 PROCEDURE — 1036F TOBACCO NON-USER: CPT | Performed by: STUDENT IN AN ORGANIZED HEALTH CARE EDUCATION/TRAINING PROGRAM

## 2024-10-15 PROCEDURE — 1157F ADVNC CARE PLAN IN RCRD: CPT | Performed by: OTOLARYNGOLOGY

## 2024-10-15 PROCEDURE — 3077F SYST BP >= 140 MM HG: CPT | Performed by: STUDENT IN AN ORGANIZED HEALTH CARE EDUCATION/TRAINING PROGRAM

## 2024-10-15 PROCEDURE — 3078F DIAST BP <80 MM HG: CPT | Performed by: STUDENT IN AN ORGANIZED HEALTH CARE EDUCATION/TRAINING PROGRAM

## 2024-10-15 PROCEDURE — 99214 OFFICE O/P EST MOD 30 MIN: CPT | Performed by: STUDENT IN AN ORGANIZED HEALTH CARE EDUCATION/TRAINING PROGRAM

## 2024-10-15 PROCEDURE — 85025 COMPLETE CBC W/AUTO DIFF WBC: CPT

## 2024-10-15 PROCEDURE — 36415 COLL VENOUS BLD VENIPUNCTURE: CPT

## 2024-10-15 PROCEDURE — 1126F AMNT PAIN NOTED NONE PRSNT: CPT | Performed by: OTOLARYNGOLOGY

## 2024-10-15 PROCEDURE — 84443 ASSAY THYROID STIM HORMONE: CPT

## 2024-10-15 RX ORDER — OLANZAPINE 5 MG/1
5 TABLET ORAL NIGHTLY
COMMUNITY

## 2024-10-15 RX ORDER — BUPROPION HYDROCHLORIDE 100 MG/1
300 TABLET ORAL ONCE
COMMUNITY

## 2024-10-15 ASSESSMENT — PATIENT HEALTH QUESTIONNAIRE - PHQ9
2. FEELING DOWN, DEPRESSED OR HOPELESS: NOT AT ALL
SUM OF ALL RESPONSES TO PHQ9 QUESTIONS 1 AND 2: 0
1. LITTLE INTEREST OR PLEASURE IN DOING THINGS: NOT AT ALL

## 2024-10-15 ASSESSMENT — PAIN SCALES - GENERAL
PAINLEVEL: 0-NO PAIN
PAINLEVEL: 0-NO PAIN

## 2024-10-24 ENCOUNTER — APPOINTMENT (OUTPATIENT)
Dept: INTEGRATIVE MEDICINE | Facility: CLINIC | Age: 74
End: 2024-10-24
Payer: MEDICARE

## 2024-10-24 DIAGNOSIS — M54.59 OTHER LOW BACK PAIN: Primary | ICD-10-CM

## 2024-10-24 PROCEDURE — 97811 ACUP 1/> W/O ESTIM EA ADD 15: CPT | Performed by: INTERNAL MEDICINE

## 2024-10-24 PROCEDURE — 97810 ACUP 1/> WO ESTIM 1ST 15 MIN: CPT | Performed by: INTERNAL MEDICINE

## 2024-10-24 NOTE — PROGRESS NOTES
Acupuncture Visit:     Subjective   Patient ID: Ludmila Bermudez is a 74 y.o. female who presents for No chief complaint on file.  Pt with .  She reported one instance of back pain related to spending all day doing yard work.          Session Information  Is this acupuncture treatment being billed to the patient's insurance company: Yes  This is visit number: 3  The patient has a total number of visits of: 8  Name of Insurance Company: Medicare a/b  Name of Supervising Physician: MATHEW Lundberg  Visit Type: Follow-up visit  Medical History Reviewed: I have reviewed pertinent medical history in EHR, and no contraindications are present to provide treatment         Review of Systems         Provider reviewed plan for the acupuncture session, precautions and contraindications. Patient/guardian/hospital staff has given consent to treat with full understanding of what to expect during the session. Before acupuncture began, provider explained to the patient to communicate at any time if the procedure was causing discomfort past their tolerance level. Patient agreed to advise acupuncturist. The acupuncturist counseled the patient on the risks of acupuncture treatment including pain, infection, bleeding, and no relief of pain. The patient was positioned comfortably. There was no evidence of infection at the site of needle insertions.    Objective   Physical Exam    Acupuncture Physical Exam  Tongue Color: Red body  Tongue Shape: Yin cracks    Treatment Plan  Treatment Goals: Pain management  Pattern Differentiation: immune imbalance, oketsu, adrenal and sugar imbalance  Treatment Principle: move qi and blood, regulate adrenal immune and sugar energetics    Acupuncture Treatment  Patient Position: Supine on a table  Needle Guage: 40 guage /.16/ Red seirin  Body Points - Left: ana luisa 5, lr 4  Body Points - Bilateral: sp 3.2 bafeng, k 6, immune x2  Other Techniques Utilized: TDP Lamp  TDP Lamp Descripton: gary  Needle Count In:  18  Needle Count Out: 18  Needle Retention Time (min): 25 minutes  Total Face to Face Time (min): 25 minutes              Assessment/Plan

## 2024-11-21 ENCOUNTER — APPOINTMENT (OUTPATIENT)
Dept: INTEGRATIVE MEDICINE | Facility: CLINIC | Age: 74
End: 2024-11-21
Payer: MEDICARE

## 2024-11-21 DIAGNOSIS — M54.59 OTHER LOW BACK PAIN: Primary | ICD-10-CM

## 2024-11-21 DIAGNOSIS — M54.2 NECK PAIN: ICD-10-CM

## 2024-11-21 PROCEDURE — 97811 ACUP 1/> W/O ESTIM EA ADD 15: CPT | Performed by: INTERNAL MEDICINE

## 2024-11-21 PROCEDURE — 97810 ACUP 1/> WO ESTIM 1ST 15 MIN: CPT | Performed by: INTERNAL MEDICINE

## 2024-11-21 NOTE — PROGRESS NOTES
Acupuncture Visit:     Subjective   Patient ID: Ludmila Bermudez is a 74 y.o. female who presents for No chief complaint on file.  PT reported 1-2 instances of low back pain and her neck has caught only one time since last tx.  She has been having intermittent difficulty lifting arms.          Session Information  Is this acupuncture treatment being billed to the patient's insurance company: Yes  This is visit number: 4  The patient has a total number of visits of: 8  Name of Insurance Company: Medicare a/b  Name of Supervising Physician: MATHEW Lundberg  Visit Type: Follow-up visit  Medical History Reviewed: I have reviewed pertinent medical history in EHR, and no contraindications are present to provide treatment         Review of Systems         Provider reviewed plan for the acupuncture session, precautions and contraindications. Patient/guardian/hospital staff has given consent to treat with full understanding of what to expect during the session. Before acupuncture began, provider explained to the patient to communicate at any time if the procedure was causing discomfort past their tolerance level. Patient agreed to advise acupuncturist. The acupuncturist counseled the patient on the risks of acupuncture treatment including pain, infection, bleeding, and no relief of pain. The patient was positioned comfortably. There was no evidence of infection at the site of needle insertions.    Objective   Physical Exam                             Assessment/Plan

## 2024-12-19 ENCOUNTER — APPOINTMENT (OUTPATIENT)
Dept: INTEGRATIVE MEDICINE | Facility: CLINIC | Age: 74
End: 2024-12-19
Payer: MEDICARE

## 2024-12-19 DIAGNOSIS — M54.59 OTHER LOW BACK PAIN: Primary | ICD-10-CM

## 2024-12-19 DIAGNOSIS — M54.2 NECK PAIN: ICD-10-CM

## 2024-12-19 PROCEDURE — 97811 ACUP 1/> W/O ESTIM EA ADD 15: CPT | Performed by: INTERNAL MEDICINE

## 2024-12-19 PROCEDURE — 97810 ACUP 1/> WO ESTIM 1ST 15 MIN: CPT | Performed by: INTERNAL MEDICINE

## 2024-12-19 NOTE — PROGRESS NOTES
Acupuncture Visit:     Subjective   Patient ID: Ludmila Bermudez is a 74 y.o. female who presents for No chief complaint on file.  PT reported that she has experienced intermittent neck and back pain.  Episodes are treated by rest and otherwise manageable.          Session Information  Is this acupuncture treatment being billed to the patient's insurance company: Yes  This is visit number: 5  The patient has a total number of visits of: 8  Name of Insurance Company: Medicare a/b  Name of Supervising Physician: MATHEW Lundberg  Medical History Reviewed: I have reviewed pertinent medical history in EHR, and no contraindications are present to provide treatment         Review of Systems         Provider reviewed plan for the acupuncture session, precautions and contraindications. Patient/guardian/hospital staff has given consent to treat with full understanding of what to expect during the session. Before acupuncture began, provider explained to the patient to communicate at any time if the procedure was causing discomfort past their tolerance level. Patient agreed to advise acupuncturist. The acupuncturist counseled the patient on the risks of acupuncture treatment including pain, infection, bleeding, and no relief of pain. The patient was positioned comfortably. There was no evidence of infection at the site of needle insertions.    Objective   Physical Exam         Treatment Plan  Treatment Goals: Pain management    Acupuncture Treatment  Patient Position: Supine on a table  Needle Guage: 40 guage /.16/ Red seirin, 36 guage /.20/ Blue seirin  Body Points - Left: li 4, gb 12, si 16, gb 21, 20  Body Points - Bilateral: sp 3.2  Body Points - Right: lr 3, gb 41  Other Techniques Utilized: TDP Lamp  TDP Lamp Descripton: gary  Needle Count In: 9  Needle Count Out: 9  Needle Retention Time (min): 25 minutes  Total Face to Face Time (min): 25 minutes              Assessment/Plan

## 2024-12-26 DIAGNOSIS — G62.0 PERIPHERAL NEUROPATHY DUE TO CHEMOTHERAPY (MULTI): ICD-10-CM

## 2024-12-26 DIAGNOSIS — T45.1X5A PERIPHERAL NEUROPATHY DUE TO CHEMOTHERAPY (MULTI): ICD-10-CM

## 2024-12-27 RX ORDER — GABAPENTIN 300 MG/1
300 CAPSULE ORAL 3 TIMES DAILY
Qty: 90 CAPSULE | Refills: 3 | Status: SHIPPED | OUTPATIENT
Start: 2024-12-27

## 2025-01-13 ENCOUNTER — APPOINTMENT (OUTPATIENT)
Dept: INTEGRATIVE MEDICINE | Facility: CLINIC | Age: 75
End: 2025-01-13
Payer: MEDICARE

## 2025-01-13 DIAGNOSIS — M54.59 OTHER LOW BACK PAIN: Primary | ICD-10-CM

## 2025-01-13 PROCEDURE — 97810 ACUP 1/> WO ESTIM 1ST 15 MIN: CPT | Performed by: INTERNAL MEDICINE

## 2025-01-13 PROCEDURE — 97811 ACUP 1/> W/O ESTIM EA ADD 15: CPT | Performed by: INTERNAL MEDICINE

## 2025-01-13 NOTE — PROGRESS NOTES
Acupuncture Visit:     Subjective   Patient ID: Ludmila Bermudez is a 74 y.o. female who presents for No chief complaint on file.  Seeking continued support for left sided neck catching and tightness and bilateral low back pain        Session Information  Is this acupuncture treatment being billed to the patient's insurance company: Yes  This is visit number: 6  The patient has a total number of visits of: 8  Name of Insurance Company: Medicare a/b  Name of Supervising Physician: MATHEW Lundberg  Visit Type: Follow-up visit  Medical History Reviewed: I have reviewed pertinent medical history in EHR, and no contraindications are present to provide treatment         Review of Systems         Provider reviewed plan for the acupuncture session, precautions and contraindications. Patient/guardian/hospital staff has given consent to treat with full understanding of what to expect during the session. Before acupuncture began, provider explained to the patient to communicate at any time if the procedure was causing discomfort past their tolerance level. Patient agreed to advise acupuncturist. The acupuncturist counseled the patient on the risks of acupuncture treatment including pain, infection, bleeding, and no relief of pain. The patient was positioned comfortably. There was no evidence of infection at the site of needle insertions.    Objective   Physical Exam              Acupuncture Treatment  Patient Position: Lateral recumbent on a table  Needle Guage: 40 guage /.16/ Red seirin, 42 guage /.14/ Lime green seirin, 36 guage /.20/ Blue seirin  Body Points - Left: sj 16, east wind, si 16, gb 12 20, 21, li 4, lr 3, gb 41  Body Points - Bilateral: ub 22, 23, 24  Other Techniques Utilized: TDP Lamp, Topicals  Topicals Description: warming lotion orange frankinscense  TDP Lamp Descripton: victoriano men  Needle Count In: 15  Needle Count Out: 15  Needle Retention Time (min): 25 minutes  Total Face to Face Time (min): 25 minutes               Assessment/Plan

## 2025-02-04 DIAGNOSIS — T45.1X5A PERIPHERAL NEUROPATHY DUE TO CHEMOTHERAPY (MULTI): ICD-10-CM

## 2025-02-04 DIAGNOSIS — G62.0 PERIPHERAL NEUROPATHY DUE TO CHEMOTHERAPY (MULTI): ICD-10-CM

## 2025-02-04 RX ORDER — GABAPENTIN 300 MG/1
300 CAPSULE ORAL 3 TIMES DAILY
Qty: 90 CAPSULE | Refills: 3 | Status: SHIPPED | OUTPATIENT
Start: 2025-02-04

## 2025-02-24 ENCOUNTER — APPOINTMENT (OUTPATIENT)
Dept: INTEGRATIVE MEDICINE | Facility: CLINIC | Age: 75
End: 2025-02-24
Payer: MEDICARE

## 2025-02-24 DIAGNOSIS — M54.59 OTHER LOW BACK PAIN: Primary | ICD-10-CM

## 2025-02-24 DIAGNOSIS — M54.2 NECK PAIN: ICD-10-CM

## 2025-02-24 PROCEDURE — 97810 ACUP 1/> WO ESTIM 1ST 15 MIN: CPT | Performed by: INTERNAL MEDICINE

## 2025-02-24 PROCEDURE — 97811 ACUP 1/> W/O ESTIM EA ADD 15: CPT | Performed by: INTERNAL MEDICINE

## 2025-02-24 NOTE — PROGRESS NOTES
Acupuncture Visit:     Subjective   Patient ID: Ludmila Bermudez is a 75 y.o. female who presents for No chief complaint on file.  Reported that back is holding up pretty well.  Discomfort is intermittent and abaits with rest; however, neck pain has increased and ROM has decreased       Session Information  Is this acupuncture treatment being billed to the patient's insurance company: Yes  This is visit number: 7  The patient has a total number of visits of: 8  Name of Insurance Company: Medicare a/b  Name of Supervising Physician: MATHEW Lundberg  Visit Type: Follow-up visit         Review of Systems         Provider reviewed plan for the acupuncture session, precautions and contraindications. Patient/guardian/hospital staff has given consent to treat with full understanding of what to expect during the session. Before acupuncture began, provider explained to the patient to communicate at any time if the procedure was causing discomfort past their tolerance level. Patient agreed to advise acupuncturist. The acupuncturist counseled the patient on the risks of acupuncture treatment including pain, infection, bleeding, and no relief of pain. The patient was positioned comfortably. There was no evidence of infection at the site of needle insertions.    Objective   Physical Exam         Treatment Plan  Treatment Goals: Pain management    Acupuncture Treatment  Patient Position: Lateral recumbent on a table  Acupuncture Needling: Yes  Needle Guage: 40 guage /.16/ Red seirin  Body Points - Left: sj 9, gb 40,gb 21, sj 15, 14 scalene x2, 17, st 9  Body Points - Bilateral: ub 22, 23, 24  Other Techniques Utilized: TDP Lamp, Topicals  Topicals Description: warming lotion  TDP Lamp Descripton: victoriano men  Needle Count In: 15  Needle Count Out: 15  Needle Retention Time (min): 30 minutes  Total Face to Face Time (min): 25 minutes              Assessment/Plan

## 2025-03-20 ENCOUNTER — APPOINTMENT (OUTPATIENT)
Dept: INTEGRATIVE MEDICINE | Facility: CLINIC | Age: 75
End: 2025-03-20
Payer: MEDICARE

## 2025-03-20 DIAGNOSIS — M54.59 OTHER LOW BACK PAIN: Primary | ICD-10-CM

## 2025-03-20 DIAGNOSIS — M54.2 NECK PAIN: ICD-10-CM

## 2025-03-20 PROCEDURE — 97811 ACUP 1/> W/O ESTIM EA ADD 15: CPT | Performed by: INTERNAL MEDICINE

## 2025-03-20 PROCEDURE — 97810 ACUP 1/> WO ESTIM 1ST 15 MIN: CPT | Performed by: INTERNAL MEDICINE

## 2025-03-20 NOTE — PROGRESS NOTES
Acupuncture Visit:     Subjective   Patient ID: Ludmila Bermudez is a 75 y.o. female who presents for No chief complaint on file.  Pt reported that neck improved but still has intermittent catching feeling.  Low back still feeling pretty good.  When discomfort occurs it is on the right side.    Previous: Reported that back is holding up pretty well.  Discomfort is intermittent and abaits with rest; however, neck pain has increased and ROM has decreased         Session Information  Is this acupuncture treatment being billed to the patient's insurance company: Yes  This is visit number: 8  The patient has a total number of visits of: 8  Name of Insurance Company: Medicare a/b  Name of Supervising Physician: MATHEW Lundberg  Visit Type: Follow-up visit         Review of Systems         Provider reviewed plan for the acupuncture session, precautions and contraindications. Patient/guardian/hospital staff has given consent to treat with full understanding of what to expect during the session. Before acupuncture began, provider explained to the patient to communicate at any time if the procedure was causing discomfort past their tolerance level. Patient agreed to advise acupuncturist. The acupuncturist counseled the patient on the risks of acupuncture treatment including pain, infection, bleeding, and no relief of pain. The patient was positioned comfortably. There was no evidence of infection at the site of needle insertions.    Objective   Physical Exam         Treatment Plan  Treatment Goals: Pain management    Acupuncture Treatment  Patient Position: Supine on a table  Needle Guage: 42 guage /.14/ Lime green seirin, 40 guage /.16/ Red seirin, 36 guage /.20/ Blue seirin  Body Points: With retention  Body Points - Left: lr 3, sj 5, gb 21, 12, si 16, east wind, sj 16  Body Points - Bilateral: li4, k 6, st qi x2,  Body Points - Right: gb 41  Other Techniques Utilized: TDP Lamp, Topicals  Topicals Description: warming  lotion  TDP Lamp Descripton: gary  Needle Count In: 16  Needle Count Out: 16  Needle Retention Time (min): 25 minutes  Total Face to Face Time (min): 25 minutes              Assessment/Plan

## 2025-03-24 ASSESSMENT — ENCOUNTER SYMPTOMS
LIGHT-HEADEDNESS: 0
HEADACHES: 0
SHORTNESS OF BREATH: 0
NUMBNESS: 1
TROUBLE SWALLOWING: 0
NECK STIFFNESS: 1
COUGH: 0
CONSTIPATION: 0
ABDOMINAL PAIN: 0
LEG SWELLING: 0
VOMITING: 0
FEVER: 0
CHILLS: 0
NAUSEA: 0
DIARRHEA: 0
BACK PAIN: 1

## 2025-03-24 NOTE — PROGRESS NOTES
Patient ID: Ludmila Bermudez is a 75 y.o. female.  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 Sabino Ragsdale  MedOnc: Dr. Kyunghee Burkitt, X, Tash Gonzalez (PA)  RadOnc: Dr. Akhil Raphael    Prior Therapy  6/8 - 7/27/21 Concurrent chemoradiation w/ 7 doses of Cisplatin (40mg/m2),  VMAT 70 gy in 35 fx     ONCOLOGIC HISTORY   - 2/2021: Started to have bilateral ear aches. She was treated and eventually ended up with ear aches only on the left side. Her throat continues to bother her, eventually developed a left-sided neck mass. Further evaluation showed a left tonsillar lesion.  - 4/30/2021: CT Neck showed bilateral neck metastasis especially on the left side (~6 cm)  - 5/2021: Biopsy of left tonsillar lesion showed p16+ SCC  - 6/8 - 7/27/21: Concurrent chemoradiation with weekly cisplatin (chemo held on 7/7 due to low platelet, but received total 7 treatments of weekly cisplatin-made up chemo was given on the last date of radiation)  - 10/28/21 Post treatment PET/CT showed very good response, but possible residual metabolic activity in Left level  3 and 4 nodes  - 1/4/22 CT Neck w/ contrast to f/u Left level 3 and 4 nodes, showed subtle irregularity of a left level 2 lymph node raising suspicion for neoplastic involvement.   - 1/31/22 FNA of Level 2 lymph node was non-diagnostic d/t insufficient cellularity  - 3/7/22 Underwent Excisional biopsy of Left level 2 node with Dr. Ragsdale. Path showing Extensive necrosis with fibro-inflammatory and xanthomatous reaction. No definite viable tumor identified.       Treatment complicated by:  - 7/31/21 Admitted due to worsening mucositis and poor oral intake. Prolonged hospitalization due to thrombocytopenia (most  likely ITP) and hematochezia. Thrombocytopenia is likely infection related vs meds side effects. She received Dexa 40mg 4/4 (8/8 - 8/11), two  sessions of IVIG ( - ), IVIG 0.4 mg/kg daily with platelets to follow (- ), s/p 2 doses of Nplate (  and )    - 21: DVT on right axillary vein  - GI bleed: seen by GI, likely secondary to low platelet count.  Per GI, low concern for UGIB.  - 21 Colonoscopy: biopsy of descending colon polyp showed tubular adenoma  -  - 21: Readmitted due to DVT in  LLE.  Started on Lovenox, but stopped due to platelets dropped. Work up for heparin induced thrombocytopenia is negative.  Started on Xarelto on 9/10/21 evening with close follow up with hematology.  - 21: Platelet 61. Taking Xarelto 15mg BID. Per hematology, decrease Xarelto to 15mg daily if platelet is low     PmHx: hx of left tonsil SCC, anxiety, depression, HLD  PsHx: radial head implant ()  FmHx: mother (colorectal cancer at age 67,  at age 94)  ScHx: lives with family, non-smoker, occasional alcohol use, retired  Allergies: Penicillins    Past Medical History:   No past medical history on file.   Surgical History:    Past Surgical History:   Procedure Laterality Date    OTHER SURGICAL HISTORY  2021    Elbow fracture repair      Family History:    Family History   Problem Relation Name Age of Onset    Rectal cancer Mother      Other (appendix disease) Father       Family Oncology History:    Cancer-related family history includes Rectal cancer in her mother.  Social History:    Social History     Tobacco Use    Smoking status: Never    Smokeless tobacco: Never      Subjective   Interval History   Patient presents today accompanied by her  for 3 year 8 months post treatment follow up.    Patient reports she's feeling very well. Their newest grandson is now 5 months old.   Patient has very good energy, she went to see another grandson play lacross in [facility] recently, and got a sunburn on her nose.   She's sleeping very well since restarting on Wellbutrin and Olanzapine.  She has seen benefits from  lymphedema therapy. Submental swelling is improved.  She's taking Gabapentin 300mg BID which is controlling her neuropathy well. Leg cramps are improved.   Still dry mouth that is managed with water. Denies dysphagia.   She continues acupuncture (Xena Duncan) for xerostomia, neck stiff, back pain, and neuropathy in her feet.   She endorse acid reflux occasionally. Advised that she take Pepcid as needed.   She's following a dentist Q6 mo.    ROS  Review of Systems   Constitutional:  Negative for chills and fever.   HENT:   Negative for hearing loss, lump/mass, mouth sores, nosebleeds, tinnitus and trouble swallowing.         Dry mouth   Respiratory:  Negative for cough and shortness of breath.    Cardiovascular:  Negative for chest pain and leg swelling.   Gastrointestinal:  Negative for abdominal pain, constipation, diarrhea, nausea and vomiting.   Musculoskeletal:  Positive for back pain and neck stiffness.   Skin:  Negative for rash.   Neurological:  Positive for numbness (Perpheral neuropathy in hands and feet). Negative for headaches and light-headedness.     Allergies  Allergies   Allergen Reactions    Penicillins Rash    Sulfamethoxazole Unknown     Feeling of numbness      Medications  Current Outpatient Medications   Medication Instructions    acetaminophen (TYLENOL ORAL) Take by mouth.    buPROPion (WELLBUTRIN) 300 mg, Once    cholecalciferol (Vitamin D3) 5,000 Units tablet Daily    cyanocobalamin, vitamin B-12, (VITAMIN B-12 ORAL) 1,000 mcg    gabapentin (NEURONTIN) 300 mg, oral, 3 times daily    Lipitor 20 mg tablet 1 tablet, Nightly    OLANZapine (ZYPREXA) 5 mg, Nightly    vit C/E/Zn/coppr/lutein/zeaxan (PRESERVISION AREDS-2 ORAL) 2 times daily      Objective   VS: /68   Pulse 87   Temp 36.8 °C (98.2 °F) (Core)   Resp 18   Wt 79.8 kg (175 lb 14.8 oz)   SpO2 99%   BMI 30.20 kg/m²   Weight: Daily Weight  04/01/25 : 79.4 kg (175 lb)  04/01/25 : 79.8 kg (175 lb 14.8 oz)  10/15/24 : 76.5 kg  (168 lb 9.6 oz)  10/15/24 : 76.1 kg (167 lb 12.3 oz)  04/26/24 : 78.1 kg (172 lb 3.2 oz)  04/26/24 : 77.2 kg (170 lb 4.8 oz)  12/22/23 : 77.1 kg (170 lb)    Physical Exam  Constitutional:       Appearance: Normal appearance. She is well-developed.   HENT:      Head: Normocephalic and atraumatic.      Right Ear: External ear normal. No tenderness.      Left Ear: External ear normal. No tenderness.      Nose: Nose normal.      Mouth/Throat:      Mouth: No injury or oral lesions.      Tongue: No lesions.   Eyes:      Extraocular Movements: Extraocular movements intact.      Conjunctiva/sclera: Conjunctivae normal.      Pupils: Pupils are equal, round, and reactive to light.   Neck:      Thyroid: No thyroid mass.   Cardiovascular:      Rate and Rhythm: Normal rate and regular rhythm.      Pulses: Normal pulses.      Heart sounds: No murmur heard.     No gallop.   Pulmonary:      Effort: Pulmonary effort is normal. No respiratory distress.      Breath sounds: Normal breath sounds. No stridor. No wheezing.   Abdominal:      General: Bowel sounds are normal. There is no distension or abdominal bruit.      Palpations: Abdomen is soft.      Tenderness: There is no abdominal tenderness.   Musculoskeletal:         General: Normal range of motion.      Cervical back: Normal range of motion and neck supple. No signs of trauma. Normal range of motion.   Lymphadenopathy:      Upper Body:      Right upper body: No axillary adenopathy.      Left upper body: No axillary adenopathy.   Skin:     General: Skin is warm and dry.      Comments: No new skin lesions   Neurological:      General: No focal deficit present.      Mental Status: She is alert and oriented to person, place, and time.      Gait: Gait is intact.   Psychiatric:         Mood and Affect: Mood and affect normal.         Behavior: Behavior is cooperative.       Diagnostic Results   Labs  Below labs are reviewed today.  Results from last 7 days   Lab Units 04/01/25  2036  "  WBC AUTO x10*3/uL 4.6   HEMOGLOBIN g/dL 13.9   HEMATOCRIT % 42.9   PLATELETS AUTO x10*3/uL 178   NEUTROS ABS x10*3/uL 2.71   LYMPHS ABS AUTO x10*3/uL 1.30   MONOS ABS AUTO x10*3/uL 0.49   EOS ABS AUTO x10*3/uL 0.08   NEUTROS PCT AUTO % 58.9   LYMPHS PCT AUTO % 28.2   MONOS PCT AUTO % 10.6   EOS PCT AUTO % 1.7     Results from last 7 days   Lab Units 04/01/25  1227   GLUCOSE mg/dL 103*   SODIUM mmol/L 144   POTASSIUM mmol/L 4.6   CHLORIDE mmol/L 103   CO2 mmol/L 31   BUN mg/dL 19   CREATININE mg/dL 0.96   EGFR mL/min/1.73m*2 63   CALCIUM mg/dL 10.2   ALBUMIN g/dL 4.6   PROTEIN TOTAL g/dL 7.0   BILIRUBIN TOTAL mg/dL 0.5   ALK PHOS U/L 114   ALT U/L 28   AST U/L 26                   Images     Pathology  Lab Results   Component Value Date    PATHREP  03/07/2022     Name OK GREENWOOD                                                                                                   Accession #: [id number]            Pathologist:                   WADAD S. MNEIMNEH, MD  Date of Procedure:    3/7/2022  Date Received:          3/7/2022  Date Reported           3/8/2022  Submitting Physician:   FARNAZ FINK MD  Location:                    [facility]  Other External #                                                                    FINAL DIAGNOSIS  \"LEFT LEVEL 2 NODE\", RESECTION:  -- EXTENSIVE NECROSIS WITH FIBROINFLAMMATORY AND XANTHOMATOUS REACTION (SEE  NOTE).  -- NO DEFINITE VIABLE TUMOR IDENTIFIED.    Note: The tissue is entirely submitted for examination. The nodule consists of  necrosis with fibroinflammatory and xanthomatous reaction and cholesterol  clefts. Within the necrosis, there is occasional markedly degenerated  keratinous material. These findings most likely represent a previously replaced  lymph node by neoplasm with extensive/complete treatment response.  No viable  tumor is identified.                                                                                                                      "                                                                                                                                                                                                                                                                                                                                                                  Electronically Signed Out By WADAD S. MNEIMNEH, MD/HORTENSIA  By the signature on this report, the individual or group listed as making the  Final Interpretation/Diagnosis certifies that they have reviewed this case.         Intraoperative Consultation:  A: LEFT LEVEL 2 NODE ( RULE OUT CANCER)    Frozen Section 1:  Date Ordered: 3/7/2022 13:04     Date Received: 3/7/2022 13:04     Date Called:  3/7/2022 13:27    Intraoperative Diagnosis:  A.  Negative for malignancy.  B.  Necrotizing granuloma.  Intraoperative Consult Pathologist(s):  HIREN REESE MD (P)      aaa/3/7/2022           Clinical History:  tonsillar cancer, status post chemoradiation      Specimens Submitted As:  A: LEFT LEVEL 2 NODE ( RULE OUT CANCER)     Gross Description:  Received fresh for intraoperative consultation, labeled with the patient's name  and hospital number, is a 2.5 x 1.5 x 0.8 cm possible lymph node with attached  adipose tissue. The lymph node is serially sectioned. The entire lymph node was  submitted for intraoperative diagnosis and the tissue remnants in 2 cassettes,  A1 FSC-A2 FSC. The  remaining fibroadipose tissue is submitted for permanent in  one cassette, A3. AAA/RL    aaa/3/7/2022               [facility]  Department of Pathology   [address] [place] [address]        PATHREP  01/31/2022                                                     MRN: [id number]  Patient Name OK GREENWOOD  Accession #: [id number]  Date of Procedure:  1/31/2022       Date Reported: 2/2/2022  Date Received:  1/31/2022  Date of Birth / Sex 1950 (Age: 71)  / F  Race: WHITE  Submitting Physician: FARNAZ FINK MD  Attending Physician: NABIL SINGH MD           Other External #          FINAL CYTOLOGICAL INTERPRETATION    A.  FINE NEEDLE ASPIRATION LEFT NECK LYMPH NODE, CYTOLOGY AND CELL BLOCK:       NONDIAGNOSTIC SPECIMEN DUE TO INSUFFICIENT CELLULARITY.        Interpretation performed at:  [facility]  Department of Pathology  [address] [place] [address]  Phone: [phone] Fax: [phone]      Slide(s) initially screened by a Cytotechnologist at [facility], [address] [place] [address]      Electronically Signed Out By COLTON VASQUEZ MD    By the signature on this report, the individual or group listed as making the  Final Interpretation/Diagnosis certifies that they have reviewed this case.  Slide(s) initially screened by a Cytotechnologist at [facility]     Clinical History      Physician Contact Number: [phone]  Clinical Diagnosis History ENLARGED LYMPH NODE   Source of Specimen  A: FINE NEEDLE ASPIRATION LEFT NECK LYMPH NODE     Specimen Submitted as:  A:  FINE NEEDLE ASPIRATION LEFT NECK LYMPH NODE        Pap stain Non-Gyn, Pap stain Non-Gyn, Pap stain Non-Gyn, Pap stain  Non-Gyn, Diff-Quik stain Non-Gyn, Diff-Quik stain Non-Gyn, Diff-Quik stain  Non-Gyn, Diff-Quik stain Non-Gyn, CELL BLOCK, H&E, Initial    Gross Description  A.  FINE NEEDLE ASPIRATION LEFT NECK LYMPH NODE:  RECEIVED 8 DIRECT SMEARS (4  AIR-DRIED AND 4 ALCOHOL-FIXED) AND 30 cc OF PINK TINTED, CLEAR NEEDLE RINSE IN  CYTOLYT WITH FEW PARTICLES.                 [facility]  Department of Pathology  [address] [place] [address]         Assessment/Plan   ASSESSMENT  Ludmila Bermudez is a 75 y.o. female with hx of p16+ SCC of left tonsil with bilateral neck disease s/p concurrent chemoradiation with weekly cispl atin  6/8/ -  "7/27/21, received 7 cycles of Cisplatin, 70 gy RT. Restaging PET/CT 10/28/21 showed very good response with possible residual metabolic activity in Level 3 & 4 nodes. Excisional biopsy 3/7/22 did not review evidence of metastatic disease.     # Left Tonsil SCC,  p16+  - 10/28/21 Post treatment PET/CT showed very good response with possible residual metabolic activity in Level 3 & 4 nodes.  - CT Neck 1/4/21 showed suspicious left cervical lymph node at level 2. FNA of left level 2 lymph node was biopsied on 1/31/22 however not enough tissues were obtained. Open biopsy on 3/7/22 with Dr. Ragsdale showed no malignancy.  - Had covid in January 2023 but is largely resolved, has a mild lingering cough.   - 4/1/25 Patient continue to do very well 3 years 8 months from treatment. Chronic SE from chemoRT including Xerostomia, mild dysphagia are stable/improving. Sleeping better as she restarted Wellbutrin and Olanzapine. Has chronic stiffness in the left neck but this is largely unchanged.     # Peripheral Neuropathy  - Patient with ongoing neuropathy in b/l hands and feet. Worst in the left foot and is causing her to wake up at night. Only taking Gabapentin 300mg at bedtime, but is still symptomatic  - Increased to Gabapentin 300mg BID and is working well to control neuropathy. Encourage her to engage Acupuncture to help with peripheral neuropathy as well.    # Lymphedema  - Patient with muscle stiffness and some swelling in the neck. Left neck \"catches\" sometimes. Likely 2ry to scar tissue and lymphedema from radiation. Benefited from lymphedema therapy.     # Low Back Pain  - 1.5 h/o LBP, onset after long road trips. Back pain occurs with muscle spasm and radiation to muscle of lumbar region. Finds some relief when leaning forward on grocery carts  - Could be arthritis vs spinal stenosis, given h/o cancer could also be mets.   - Patient scheduled to start PT on 6/1  - MRI Lumbar/Sacrum did not show any neoplasm related " change, though does note narrowing of multilevel degenerative changes of the lumbar spine.  - Patient completed PT and continues with acupuncture for LBP which has been very helpful     # Left side of tongue pain: improved  - Lesion noted on left side of tongue, patient has had pain there since starting CRT. Previously thought it was d/t irritation from adjacent silver cavity filling, but pain persists after filling was  changed to plastic. Likely SE from radiation      # Xerostomia/dysgeusia  - Improving with acupuncture notes saliva production returning, seeing acupuncturist Q2wks  - Encourage patient to try Xylimelt again or Biotene oral balance gel to see if it can provide more lasting dry mouth relief at night.     # Eczema    - Red scaly rash on low back, belly, nape of neck, saw Derm, dx with Eczema and was prescribed topical steroids. Pt is not always compliant with this and she scratches at the rash. Advised patient to  follow derm recs     # Elevated Ferritin  - No family history of hemachromatosis. She is post menopausal and ferritin is <300, TSAT 26% (NOT >45%). Pt without s/s that would indicate hemochromatosis I.e fatigue, generalized pain. Liver enzymes WNL, no s/s of iron overload. As Ferritin is elevated but TSAT is normal, more likely the elevated ferritin is due to chronic inflammatory state.     PLAN  -- RTC in 6 mo with ENT/MedOnc on 10/21/25, labs cbc, cmp, tsh/t4  -- Continue Gabapentin 300mg BID for left foot neuropathy. Can increase to TID if needed.  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

## 2025-03-31 NOTE — PROGRESS NOTES
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 She received a combination of chemotherapy and radiation. I cannot appreciate any recurrent tumor.  A chest x-ray will be obtained today.    Impacted cerumen in the left ear which was addressed with a small instrument.     Minimal dysphagia.    I will see her in 6 months.      Chief Complaint     Follow-up regarding the management of a left tonsillar cancer.      History of Present Illness    This lady was seen in May 2021 at the request of a local colleague. Back in February 2020 when she started complaining of bilateral earaches. She was treated and eventually ended up with earaches only on the left side. Her throat continues to bother her. She eventually developed a left-sided neck mass. This led to further evaluation which showed a left tonsillar lesion. This was biopsied and consistent with a p16 positive squamous cell carcinoma. She also had a CT scan of her neck that shows bilateral neck metastasis especially on the left side. She received a combination of chemotherapy and radiation. She finished on July 27, 2021. She really has had all sorts of issues during her treatments and ended up being in the hospital for 27 days. She had a PET scan done on October 28, 2021. I personally reviewed that scan that shows an uptake in the left a level 2. On January 4, 2022 she had a CT scan that I personally reviewed that shows there is a 1 cm node in the left level 2 that was described as suspicious by the radiologist. She was sent for an ultrasound-guided needle aspirate. Unfortunately this was not diagnostic. She was eventually brought to the operating room on March 7, 2022. The lymph node was removed. It did not show any cancer. She does have some limitation in swallowing because of dryness. She denies any pain or discomfort. She had a chest x-ray  in April 2024 that was negative. She had a TSH level in October 2024 which was normal.     Physical Exam       Examination of the oral cavity and oropharynx shows evidence of dryness but no evidence of any suspicious lesions. There is good mobility of the tongue and palate. There is good mandibular excursion. Palpation of the parotid, neck, and thyroid field is negative for any masses or adenopathies.  Her left neck is somewhat indurated.     A flexible laryngoscopy was carried out. Under topical Xylocaine and Moiz-Synephrine the scope was introduced through the nostril. The nasopharynx, base of tongue, hypopharynx, and larynx are visualized. The vocal cords are normally mobile.  Minimal puffiness and no retained secretions.    The ear examination shows impacted cerumen on the left which was addressed with a small instrument.

## 2025-04-01 ENCOUNTER — LAB (OUTPATIENT)
Dept: LAB | Facility: HOSPITAL | Age: 75
End: 2025-04-01
Payer: MEDICARE

## 2025-04-01 ENCOUNTER — OFFICE VISIT (OUTPATIENT)
Dept: OTOLARYNGOLOGY | Facility: HOSPITAL | Age: 75
End: 2025-04-01
Payer: MEDICARE

## 2025-04-01 ENCOUNTER — OFFICE VISIT (OUTPATIENT)
Dept: HEMATOLOGY/ONCOLOGY | Facility: HOSPITAL | Age: 75
End: 2025-04-01
Payer: MEDICARE

## 2025-04-01 ENCOUNTER — HOSPITAL ENCOUNTER (OUTPATIENT)
Dept: RADIOLOGY | Facility: HOSPITAL | Age: 75
Discharge: HOME | End: 2025-04-01
Payer: MEDICARE

## 2025-04-01 VITALS — WEIGHT: 175 LBS | TEMPERATURE: 98.2 F | HEIGHT: 64 IN | BODY MASS INDEX: 29.88 KG/M2

## 2025-04-01 VITALS
OXYGEN SATURATION: 99 % | HEART RATE: 87 BPM | RESPIRATION RATE: 18 BRPM | WEIGHT: 175.93 LBS | SYSTOLIC BLOOD PRESSURE: 163 MMHG | BODY MASS INDEX: 30.2 KG/M2 | TEMPERATURE: 98.2 F | DIASTOLIC BLOOD PRESSURE: 68 MMHG

## 2025-04-01 DIAGNOSIS — I10 HYPERTENSION, UNSPECIFIED TYPE: ICD-10-CM

## 2025-04-01 DIAGNOSIS — E03.2 HYPOTHYROIDISM DUE TO MEDICATION: ICD-10-CM

## 2025-04-01 DIAGNOSIS — H61.22 IMPACTED CERUMEN OF LEFT EAR: ICD-10-CM

## 2025-04-01 DIAGNOSIS — Z85.89 ENCOUNTER FOR FOLLOW-UP SURVEILLANCE OF HEAD AND NECK CANCER: Primary | ICD-10-CM

## 2025-04-01 DIAGNOSIS — G62.0 PERIPHERAL NEUROPATHY DUE TO CHEMOTHERAPY (MULTI): ICD-10-CM

## 2025-04-01 DIAGNOSIS — C09.9 MALIGNANT NEOPLASM OF TONSIL: ICD-10-CM

## 2025-04-01 DIAGNOSIS — I89.0 LYMPHEDEMA DUE TO AND NOT CONCURRENT WITH IONIZING RADIOTHERAPY: ICD-10-CM

## 2025-04-01 DIAGNOSIS — R13.12 DYSPHAGIA, OROPHARYNGEAL PHASE: ICD-10-CM

## 2025-04-01 DIAGNOSIS — K11.7 XEROSTOMIA: ICD-10-CM
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
## 2025-04-01 DIAGNOSIS — C09.9 MALIGNANT NEOPLASM OF TONSIL: Primary | ICD-10-CM

## 2025-04-01 DIAGNOSIS — T45.1X5A PERIPHERAL NEUROPATHY DUE TO CHEMOTHERAPY (MULTI): ICD-10-CM

## 2025-04-01 DIAGNOSIS — Z08 ENCOUNTER FOR FOLLOW-UP SURVEILLANCE OF HEAD AND NECK CANCER: Primary | ICD-10-CM

## 2025-04-01 LAB
ALBUMIN SERPL BCP-MCNC: 4.6 G/DL (ref 3.4–5)
ALP SERPL-CCNC: 114 U/L (ref 33–136)
ALT SERPL W P-5'-P-CCNC: 28 U/L (ref 7–45)
ANION GAP SERPL CALC-SCNC: 15 MMOL/L (ref 10–20)
AST SERPL W P-5'-P-CCNC: 26 U/L (ref 9–39)
BASOPHILS # BLD AUTO: 0.01 X10*3/UL (ref 0–0.1)
BASOPHILS NFR BLD AUTO: 0.2 %
BILIRUB SERPL-MCNC: 0.5 MG/DL (ref 0–1.2)
BUN SERPL-MCNC: 19 MG/DL (ref 6–23)
CALCIUM SERPL-MCNC: 10.2 MG/DL (ref 8.6–10.3)
CHLORIDE SERPL-SCNC: 103 MMOL/L (ref 98–107)
CO2 SERPL-SCNC: 31 MMOL/L (ref 21–32)
CREAT SERPL-MCNC: 0.96 MG/DL (ref 0.5–1.05)
EGFRCR SERPLBLD CKD-EPI 2021: 63 ML/MIN/1.73M*2
EOSINOPHIL # BLD AUTO: 0.08 X10*3/UL (ref 0–0.4)
EOSINOPHIL NFR BLD AUTO: 1.7 %
ERYTHROCYTE [DISTWIDTH] IN BLOOD BY AUTOMATED COUNT: 11.9 % (ref 11.5–14.5)
GLUCOSE SERPL-MCNC: 103 MG/DL (ref 74–99)
HCT VFR BLD AUTO: 42.9 % (ref 36–46)
HGB BLD-MCNC: 13.9 G/DL (ref 12–16)
IMM GRANULOCYTES # BLD AUTO: 0.02 X10*3/UL (ref 0–0.5)
IMM GRANULOCYTES NFR BLD AUTO: 0.4 % (ref 0–0.9)
LYMPHOCYTES # BLD AUTO: 1.3 X10*3/UL (ref 0.8–3)
LYMPHOCYTES NFR BLD AUTO: 28.2 %
MCH RBC QN AUTO: 31.8 PG (ref 26–34)
MCHC RBC AUTO-ENTMCNC: 32.4 G/DL (ref 32–36)
MCV RBC AUTO: 98 FL (ref 80–100)
MONOCYTES # BLD AUTO: 0.49 X10*3/UL (ref 0.05–0.8)
MONOCYTES NFR BLD AUTO: 10.6 %
NEUTROPHILS # BLD AUTO: 2.71 X10*3/UL (ref 1.6–5.5)
NEUTROPHILS NFR BLD AUTO: 58.9 %
NRBC BLD-RTO: 0 /100 WBCS (ref 0–0)
PLATELET # BLD AUTO: 178 X10*3/UL (ref 150–450)
POTASSIUM SERPL-SCNC: 4.6 MMOL/L (ref 3.5–5.3)
PROT SERPL-MCNC: 7 G/DL (ref 6.4–8.2)
RBC # BLD AUTO: 4.37 X10*6/UL (ref 4–5.2)
SODIUM SERPL-SCNC: 144 MMOL/L (ref 136–145)
TSH SERPL-ACNC: 3.33 MIU/L (ref 0.44–3.98)
WBC # BLD AUTO: 4.6 X10*3/UL (ref 4.4–11.3)

## 2025-04-01 PROCEDURE — 1036F TOBACCO NON-USER: CPT | Performed by: STUDENT IN AN ORGANIZED HEALTH CARE EDUCATION/TRAINING PROGRAM

## 2025-04-01 PROCEDURE — 1126F AMNT PAIN NOTED NONE PRSNT: CPT | Performed by: STUDENT IN AN ORGANIZED HEALTH CARE EDUCATION/TRAINING PROGRAM

## 2025-04-01 PROCEDURE — 69210 REMOVE IMPACTED EAR WAX UNI: CPT | Performed by: OTOLARYNGOLOGY

## 2025-04-01 PROCEDURE — 31575 DIAGNOSTIC LARYNGOSCOPY: CPT | Performed by: OTOLARYNGOLOGY

## 2025-04-01 PROCEDURE — 36415 COLL VENOUS BLD VENIPUNCTURE: CPT

## 2025-04-01 PROCEDURE — 1157F ADVNC CARE PLAN IN RCRD: CPT | Performed by: STUDENT IN AN ORGANIZED HEALTH CARE EDUCATION/TRAINING PROGRAM

## 2025-04-01 PROCEDURE — 85025 COMPLETE CBC W/AUTO DIFF WBC: CPT

## 2025-04-01 PROCEDURE — 99213 OFFICE O/P EST LOW 20 MIN: CPT | Performed by: OTOLARYNGOLOGY

## 2025-04-01 PROCEDURE — 1160F RVW MEDS BY RX/DR IN RCRD: CPT | Performed by: STUDENT IN AN ORGANIZED HEALTH CARE EDUCATION/TRAINING PROGRAM

## 2025-04-01 PROCEDURE — 99213 OFFICE O/P EST LOW 20 MIN: CPT | Mod: 25 | Performed by: OTOLARYNGOLOGY

## 2025-04-01 PROCEDURE — 1160F RVW MEDS BY RX/DR IN RCRD: CPT | Performed by: OTOLARYNGOLOGY

## 2025-04-01 PROCEDURE — 1126F AMNT PAIN NOTED NONE PRSNT: CPT | Performed by: OTOLARYNGOLOGY

## 2025-04-01 PROCEDURE — 1159F MED LIST DOCD IN RCRD: CPT | Performed by: OTOLARYNGOLOGY

## 2025-04-01 PROCEDURE — 1036F TOBACCO NON-USER: CPT | Performed by: OTOLARYNGOLOGY

## 2025-04-01 PROCEDURE — 71046 X-RAY EXAM CHEST 2 VIEWS: CPT

## 2025-04-01 PROCEDURE — 1157F ADVNC CARE PLAN IN RCRD: CPT | Performed by: OTOLARYNGOLOGY

## 2025-04-01 PROCEDURE — 84075 ASSAY ALKALINE PHOSPHATASE: CPT

## 2025-04-01 PROCEDURE — 3008F BODY MASS INDEX DOCD: CPT | Performed by: OTOLARYNGOLOGY

## 2025-04-01 PROCEDURE — 99215 OFFICE O/P EST HI 40 MIN: CPT | Performed by: STUDENT IN AN ORGANIZED HEALTH CARE EDUCATION/TRAINING PROGRAM

## 2025-04-01 PROCEDURE — 99215 OFFICE O/P EST HI 40 MIN: CPT | Mod: 25 | Performed by: STUDENT IN AN ORGANIZED HEALTH CARE EDUCATION/TRAINING PROGRAM

## 2025-04-01 PROCEDURE — 84443 ASSAY THYROID STIM HORMONE: CPT

## 2025-04-01 PROCEDURE — G2211 COMPLEX E/M VISIT ADD ON: HCPCS | Performed by: STUDENT IN AN ORGANIZED HEALTH CARE EDUCATION/TRAINING PROGRAM

## 2025-04-01 PROCEDURE — 1159F MED LIST DOCD IN RCRD: CPT | Performed by: STUDENT IN AN ORGANIZED HEALTH CARE EDUCATION/TRAINING PROGRAM

## 2025-04-01 PROCEDURE — 71046 X-RAY EXAM CHEST 2 VIEWS: CPT | Performed by: RADIOLOGY

## 2025-04-01 ASSESSMENT — PAIN SCALES - GENERAL
PAINLEVEL_OUTOF10: 0-NO PAIN
PAINLEVEL_OUTOF10: 0-NO PAIN

## 2025-04-01 ASSESSMENT — PATIENT HEALTH QUESTIONNAIRE - PHQ9
2. FEELING DOWN, DEPRESSED OR HOPELESS: NOT AT ALL
1. LITTLE INTEREST OR PLEASURE IN DOING THINGS: NOT AT ALL
SUM OF ALL RESPONSES TO PHQ9 QUESTIONS 1 & 2: 0

## 2025-05-12 ENCOUNTER — APPOINTMENT (OUTPATIENT)
Dept: INTEGRATIVE MEDICINE | Facility: CLINIC | Age: 75
End: 2025-05-12
Payer: MEDICARE

## 2025-05-12 DIAGNOSIS — M54.59 OTHER LOW BACK PAIN: Primary | ICD-10-CM

## 2025-05-12 NOTE — PROGRESS NOTES
Acupuncture Visit:     Subjective   Patient ID: Ludmila Bermudez is a 75 y.o. female who presents for No chief complaint on file.  PT reported that her back is ok.  She just returned from car trip to [place] and experienced some cramping.  Seeking continued support for low back pain.        Session Information  Is this acupuncture treatment being billed to the patient's insurance company: Yes  This is visit number: 1  The patient has a total number of visits of: 12  Initial Acupuncture Treatment date: 05/12/25  Last Treatment date: 08/10/25  Name of Insurance Company: Medicare a/b  Name of Supervising Physician: MATHEW Lundberg  Visit Type: Follow-up visit  Medical History Reviewed: I have reviewed pertinent medical history in EHR, and no contraindications are present to provide treatment         Review of Systems         Provider reviewed plan for the acupuncture session, precautions and contraindications. Patient/guardian/hospital staff has given consent to treat with full understanding of what to expect during the session. Before acupuncture began, provider explained to the patient to communicate at any time if the procedure was causing discomfort past their tolerance level. Patient agreed to advise acupuncturist. The acupuncturist counseled the patient on the risks of acupuncture treatment including pain, infection, bleeding, and no relief of pain. The patient was positioned comfortably. There was no evidence of infection at the site of needle insertions.    Objective   Physical Exam         Treatment Plan  Treatment Goals: Pain management    Acupuncture Treatment  Patient Position: Supine on a table  Needle Guage: 40 guage /.16/ Red seirin, 42 guage /.14/ Lime green seirin  Body Points - Left: sj 16, si 16  Body Points - Bilateral: baxie, k3, lr 3, li 4,  Other Techniques Utilized: TDP Lamp  TDP Lamp Descripton: gary  Needle Count In: 16  Needle Count Out: 16  Needle Retention Time (min): 25 minutes  Total Face to Face  Time (min): 25 minutes              Assessment/Plan

## 2025-05-19 ENCOUNTER — APPOINTMENT (OUTPATIENT)
Dept: INTEGRATIVE MEDICINE | Facility: CLINIC | Age: 75
End: 2025-05-19
Payer: MEDICARE

## 2025-05-19 DIAGNOSIS — M54.59 OTHER LOW BACK PAIN: Primary | ICD-10-CM

## 2025-05-19 DIAGNOSIS — M54.2 NECK PAIN: ICD-10-CM

## 2025-05-19 NOTE — PROGRESS NOTES
Acupuncture Visit:     Subjective   Patient ID: Ludmila Bermudez is a 75 y.o. female who presents for No chief complaint on file.  Pt with .  She reported back is doing well.  She noted no cramping since last session.  Some neck tightness and catching.          Session Information  Is this acupuncture treatment being billed to the patient's insurance company: Yes  This is visit number: 2  The patient has a total number of visits of: 12  Initial Acupuncture Treatment date: 05/12/25  Last Treatment date: 08/10/25  Name of Insurance Company: Medicare a/b  Name of Supervising Physician: MATHEW Lundberg  Visit Type: Follow-up visit  Medical History Reviewed: I have reviewed pertinent medical history in EHR, and no contraindications are present to provide treatment         Review of Systems         Provider reviewed plan for the acupuncture session, precautions and contraindications. Patient/guardian/hospital staff has given consent to treat with full understanding of what to expect during the session. Before acupuncture began, provider explained to the patient to communicate at any time if the procedure was causing discomfort past their tolerance level. Patient agreed to advise acupuncturist. The acupuncturist counseled the patient on the risks of acupuncture treatment including pain, infection, bleeding, and no relief of pain. The patient was positioned comfortably. There was no evidence of infection at the site of needle insertions.    Objective   Physical Exam         Treatment Plan  Treatment Goals: Pain management    Acupuncture Treatment  Patient Position: Supine on a table  Needle Guage: 40 guage /.16/ Red seirin  Body Points - Left: li 4, 11, si 16, gb 20, st 8  Body Points - Bilateral: lr 8, k 6, st 36,  Body Points - Right: lr3  Other Techniques Utilized: TDP Lamp  TDP Lamp Descripton: gary  Needle Count In: 12  Needle Count Out: 12  Needle Retention Time (min): 25 minutes  Total Face to Face Time (min): 25  minutes              Assessment/Plan

## 2025-05-29 ENCOUNTER — APPOINTMENT (OUTPATIENT)
Dept: INTEGRATIVE MEDICINE | Facility: CLINIC | Age: 75
End: 2025-05-29
Payer: MEDICARE

## 2025-05-29 DIAGNOSIS — M54.59 OTHER LOW BACK PAIN: Primary | ICD-10-CM

## 2025-05-29 PROCEDURE — 97810 ACUP 1/> WO ESTIM 1ST 15 MIN: CPT | Performed by: INTERNAL MEDICINE

## 2025-05-29 PROCEDURE — 97811 ACUP 1/> W/O ESTIM EA ADD 15: CPT | Performed by: INTERNAL MEDICINE

## 2025-05-29 NOTE — PROGRESS NOTES
Acupuncture Visit:     Subjective   Patient ID: Ludmila Bermudez is a 75 y.o. female who presents for No chief complaint on file.  Seeking ongoing support for low back and neck pain.  She reported that the catching in her neck has reduced and improved ROM though the area feels puffy but is without pain.  She was directed to speak with primary medical team for concerns on puffiness.  Examination of area revealed some soft puffiness with increased pliability of the connective tissue in neck and shoulder.  Low back surfaced a few times but resolved with rest.          Session Information  Is this acupuncture treatment being billed to the patient's insurance company: Yes  This is visit number: 2  The patient has a total number of visits of: 12  Initial Acupuncture Treatment date: 05/12/25  Last Treatment date: 08/10/25  Name of Insurance Company: Medicare a/b  Visit Type: Follow-up visit  Medical History Reviewed: I have reviewed pertinent medical history in EHR, and no contraindications are present to provide treatment         Review of Systems         Provider reviewed plan for the acupuncture session, precautions and contraindications. Patient/guardian/hospital staff has given consent to treat with full understanding of what to expect during the session. Before acupuncture began, provider explained to the patient to communicate at any time if the procedure was causing discomfort past their tolerance level. Patient agreed to advise acupuncturist. The acupuncturist counseled the patient on the risks of acupuncture treatment including pain, infection, bleeding, and no relief of pain. The patient was positioned comfortably. There was no evidence of infection at the site of needle insertions.    Objective   Physical Exam         Treatment Plan  Treatment Goals: Pain management    Acupuncture Treatment  Patient Position: Lateral recumbent on a table  Needle Guage: 40 guage /.16/ Red sindy, 36 guage /.20/ Blue  sindy  Body Points - Left: k3  Body Points - Bilateral: 2nd meme, ub 22, 23, 24, 20, 21  Other Techniques Utilized: TDP Lamp, Topicals  Topicals Description: warming lotion  TDP Lamp Descripton: rafa  Needle Count In: 13  Needle Count Out: 13              Assessment/Plan

## 2025-06-05 ENCOUNTER — APPOINTMENT (OUTPATIENT)
Dept: INTEGRATIVE MEDICINE | Facility: CLINIC | Age: 75
End: 2025-06-05
Payer: MEDICARE

## 2025-06-05 DIAGNOSIS — M54.59 OTHER LOW BACK PAIN: Primary | ICD-10-CM

## 2025-06-05 PROCEDURE — 97811 ACUP 1/> W/O ESTIM EA ADD 15: CPT | Performed by: INTERNAL MEDICINE

## 2025-06-05 PROCEDURE — 97810 ACUP 1/> WO ESTIM 1ST 15 MIN: CPT | Performed by: INTERNAL MEDICINE

## 2025-06-05 NOTE — PROGRESS NOTES
Acupuncture Visit:     Subjective   Patient ID: Ludmila Bermudez is a 75 y.o. female who presents for No chief complaint on file.  Pt with  seeking ongoing support for low back pain.  She has not experienced any low back pain since last tx.       Previous: Seeking ongoing support for low back and neck pain.  She reported that the catching in her neck has reduced and improved ROM though the area feels puffy but is without pain.  She was directed to speak with primary medical team for concerns on puffiness.  Examination of area revealed some soft puffiness with increased pliability of the connective tissue in neck and shoulder.  Low back surfaced a few times but resolved with rest.                   Session Information  Is this acupuncture treatment being billed to the patient's insurance company: Yes  This is visit number: 3  The patient has a total number of visits of: 12  Initial Acupuncture Treatment date: 05/12/25  Last Treatment date: 08/10/25  Name of Insurance Company: Medicare a/b  Name of Supervising Physician: MATHEW Lundberg  Visit Type: Follow-up visit  Medical History Reviewed: I have reviewed pertinent medical history in EHR, and no contraindications are present to provide treatment         Review of Systems         Provider reviewed plan for the acupuncture session, precautions and contraindications. Patient/guardian/hospital staff has given consent to treat with full understanding of what to expect during the session. Before acupuncture began, provider explained to the patient to communicate at any time if the procedure was causing discomfort past their tolerance level. Patient agreed to advise acupuncturist. The acupuncturist counseled the patient on the risks of acupuncture treatment including pain, infection, bleeding, and no relief of pain. The patient was positioned comfortably. There was no evidence of infection at the site of needle insertions.    Objective   Physical Exam    Acupuncture  Physical Exam  Tongue Color: Red body, Red edges, Red tip    Treatment Plan  Treatment Goals: Pain management    Acupuncture Treatment  Patient Position: Supine on a table  Acupuncture Needling: Yes  Needle Guage: 40 guage /.16/ Red seirin, 36 guage /.20/ Blue seirin, 42 guage /.14/ Lime green seirin  Body Points - Left: gb 41, li 4  Body Points - Bilateral: k 6, cv 12, 14  Body Points - Right: sj 5, lr 3  Other Techniques Utilized: TDP Lamp  TDP Lamp Descripton: gary  Needle Count In: 7  Needle Count Out: 7  Needle Retention Time (min): 30 minutes  Total Face to Face Time (min): 25 minutes              Assessment/Plan

## 2025-06-12 ENCOUNTER — APPOINTMENT (OUTPATIENT)
Dept: INTEGRATIVE MEDICINE | Facility: CLINIC | Age: 75
End: 2025-06-12
Payer: MEDICARE

## 2025-06-12 DIAGNOSIS — M54.59 OTHER LOW BACK PAIN: Primary | ICD-10-CM

## 2025-06-12 PROCEDURE — 97811 ACUP 1/> W/O ESTIM EA ADD 15: CPT | Performed by: INTERNAL MEDICINE

## 2025-06-12 PROCEDURE — 97810 ACUP 1/> WO ESTIM 1ST 15 MIN: CPT | Performed by: INTERNAL MEDICINE

## 2025-06-12 NOTE — PROGRESS NOTES
Acupuncture Visit:     Subjective   Patient ID: Ludmila Bermudez is a 75 y.o. female who presents for No chief complaint on file.  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  She is not able to hold him all day and walk around but an play.  Seeking maintenance for low back pain.          Session Information  Is this acupuncture treatment being billed to the patient's insurance company: Yes  This is visit number: 4  The patient has a total number of visits of: 12  Initial Acupuncture Treatment date: 05/12/25  Last Treatment date: 08/10/25  Name of Insurance Company: Medicare a/b  Name of Supervising Physician: MATHEW Lundberg  Medical History Reviewed: I have reviewed pertinent medical history in EHR, and no contraindications are present to provide treatment         Review of Systems         Provider reviewed plan for the acupuncture session, precautions and contraindications. Patient/guardian/hospital staff has given consent to treat with full understanding of what to expect during the session. Before acupuncture began, provider explained to the patient to communicate at any time if the procedure was causing discomfort past their tolerance level. Patient agreed to advise acupuncturist. The acupuncturist counseled the patient on the risks of acupuncture treatment including pain, infection, bleeding, and no relief of pain. The patient was positioned comfortably. There was no evidence of infection at the site of needle insertions.    Objective   Physical Exam         Treatment Plan  Treatment Goals: Pain management    Acupuncture Treatment  Patient Position: Supine on a table  Needle Guage: 40 guage /.16/ Red seirin, 42 guage /.14/ Lime green seirin  Body Points - Bilateral: k6, gb 41, sj 5, lr 3, li4  Other Techniques Utilized: TDP Lamp  TDP Lamp Descripton: gary  Needle Count In: 10  Needle Count Out: 10  Needle Retention Time (min): 25 minutes  Total  Face to Face Time (min): 25 minutes              Assessment/Plan

## 2025-06-19 ENCOUNTER — APPOINTMENT (OUTPATIENT)
Dept: INTEGRATIVE MEDICINE | Facility: CLINIC | Age: 75
End: 2025-06-19
Payer: MEDICARE

## 2025-06-26 ENCOUNTER — APPOINTMENT (OUTPATIENT)
Dept: INTEGRATIVE MEDICINE | Facility: CLINIC | Age: 75
End: 2025-06-26
Payer: MEDICARE

## 2025-07-03 ENCOUNTER — APPOINTMENT (OUTPATIENT)
Dept: INTEGRATIVE MEDICINE | Facility: CLINIC | Age: 75
End: 2025-07-03
Payer: MEDICARE

## 2025-07-03 DIAGNOSIS — G62.0 PERIPHERAL NEUROPATHY DUE TO CHEMOTHERAPY (MULTI): ICD-10-CM

## 2025-07-03 DIAGNOSIS — M54.59 OTHER LOW BACK PAIN: Primary | ICD-10-CM

## 2025-07-03 DIAGNOSIS — T45.1X5A PERIPHERAL NEUROPATHY DUE TO CHEMOTHERAPY (MULTI): ICD-10-CM

## 2025-07-03 DIAGNOSIS — M54.2 NECK PAIN: ICD-10-CM

## 2025-07-03 PROCEDURE — 97810 ACUP 1/> WO ESTIM 1ST 15 MIN: CPT | Performed by: INTERNAL MEDICINE

## 2025-07-03 PROCEDURE — 97811 ACUP 1/> W/O ESTIM EA ADD 15: CPT | Performed by: INTERNAL MEDICINE

## 2025-07-03 NOTE — PROGRESS NOTES
Acupuncture Visit:     Subjective   Patient ID: Ludmila Bermudez is a 75 y.o. female who presents for No chief complaint on file.  Pt with .  She reported that her neck has been catching with pain and tightness, right shoulder pain, neuropathy pain in left foot present with escalation of back pain related to trip to [place].        Session Information  Is this acupuncture treatment being billed to the patient's insurance company: Yes  This is visit number: 5  The patient has a total number of visits of: 12  Initial Acupuncture Treatment date: 05/12/25  Last Treatment date: 08/10/25  Name of Insurance Company: Medicare a/b  Name of Supervising Physician: MATHEW Lundberg  Visit Type: Follow-up visit  Medical History Reviewed: I have reviewed pertinent medical history in EHR, and no contraindications are present to provide treatment         Review of Systems         Provider reviewed plan for the acupuncture session, precautions and contraindications. Patient/guardian/hospital staff has given consent to treat with full understanding of what to expect during the session. Before acupuncture began, provider explained to the patient to communicate at any time if the procedure was causing discomfort past their tolerance level. Patient agreed to advise acupuncturist. The acupuncturist counseled the patient on the risks of acupuncture treatment including pain, infection, bleeding, and no relief of pain. The patient was positioned comfortably. There was no evidence of infection at the site of needle insertions.    Objective   Physical Exam         Treatment Plan  Treatment Goals: Pain management    Acupuncture Treatment  Patient Position: Lateral recumbent on a table  Acupuncture Needling: Yes  Needle Guage: 40 guage /.16/ Red seirin, 42 guage /.14/ Lime green seirin, 36 guage /.20/ Blue seirin, 32 guage /.25/ Purple seirin  Body Points - Left: bafeng, k6  Body Points - Right: km adrenal x3, gb 21, 20, ub 22, 23, 24, si 9,  10,11  Other Techniques Utilized: TDP Lamp, Topicals  Topicals Description: warming lotion  TDP Lamp Descripton: victoriano men  Needle Count In: 16  Needle Count Out: 16  Needle Retention Time (min): 25 minutes  Total Face to Face Time (min): 25 minutes              Assessment/Plan

## 2025-07-10 ENCOUNTER — APPOINTMENT (OUTPATIENT)
Dept: INTEGRATIVE MEDICINE | Facility: CLINIC | Age: 75
End: 2025-07-10
Payer: MEDICARE

## 2025-07-10 DIAGNOSIS — M54.59 OTHER LOW BACK PAIN: Primary | ICD-10-CM

## 2025-07-10 DIAGNOSIS — M25.511 ACUTE PAIN OF RIGHT SHOULDER: ICD-10-CM

## 2025-07-10 PROCEDURE — 97811 ACUP 1/> W/O ESTIM EA ADD 15: CPT | Performed by: INTERNAL MEDICINE

## 2025-07-10 PROCEDURE — 97810 ACUP 1/> WO ESTIM 1ST 15 MIN: CPT | Performed by: INTERNAL MEDICINE

## 2025-07-10 NOTE — PROGRESS NOTES
Acupuncture Visit:     Subjective   Patient ID: Ludmila Bermudez is a 75 y.o. female who presents for No chief complaint on file.  Pt reported that her back has been good but most problematic has been right shoulder pain.  When reaching forward shoulder clicks and produces pain.  Seeking continued support for back pain and shoulder pain.          Session Information  Is this acupuncture treatment being billed to the patient's insurance company: Yes  This is visit number: 6  The patient has a total number of visits of: 12  Initial Acupuncture Treatment date: 05/12/25  Last Treatment date: 08/10/25  Name of Insurance Company: Medicare a/b  Name of Supervising Physician: MATHEW Lundberg  Visit Type: Follow-up visit  Medical History Reviewed: I have reviewed pertinent medical history in EHR, and no contraindications are present to provide treatment         Review of Systems         Provider reviewed plan for the acupuncture session, precautions and contraindications. Patient/guardian/hospital staff has given consent to treat with full understanding of what to expect during the session. Before acupuncture began, provider explained to the patient to communicate at any time if the procedure was causing discomfort past their tolerance level. Patient agreed to advise acupuncturist. The acupuncturist counseled the patient on the risks of acupuncture treatment including pain, infection, bleeding, and no relief of pain. The patient was positioned comfortably. There was no evidence of infection at the site of needle insertions.    Objective   Physical Exam         Treatment Plan  Treatment Goals: Pain management    Acupuncture Treatment  Patient Position: Lateral recumbent on a table  Needle Guage: 36 guage /.20/ Blue seirin  Body Points - Bilateral: ub 23, 22, 20,  Body Points - Right: si 9, 10, 11, 13, gb 21, sj 14, li 15, shoulder pt  Other Techniques Utilized: TDP Lamp, Topicals  Topicals Description: warming lotion  TDP Lamp  Descripton: shoulder  Needle Count In: 14  Needle Count Out: 14  Needle Retention Time (min): 30 minutes  Total Face to Face Time (min): 25 minutes              Assessment/Plan

## 2025-07-14 ENCOUNTER — APPOINTMENT (OUTPATIENT)
Dept: INTEGRATIVE MEDICINE | Facility: CLINIC | Age: 75
End: 2025-07-14
Payer: MEDICARE

## 2025-07-14 DIAGNOSIS — M54.59 OTHER LOW BACK PAIN: Primary | ICD-10-CM

## 2025-07-14 PROCEDURE — 97810 ACUP 1/> WO ESTIM 1ST 15 MIN: CPT | Performed by: INTERNAL MEDICINE

## 2025-07-14 PROCEDURE — 97811 ACUP 1/> W/O ESTIM EA ADD 15: CPT | Performed by: INTERNAL MEDICINE

## 2025-07-14 NOTE — PROGRESS NOTES
Acupuncture Visit:     Subjective   Patient ID: Ludmila Bermudez is a 75 y.o. female who presents for No chief complaint on file.  PT reported that she felt good for a few days.  Clicking has returned but improved ROM has sustained.  Back pain returned after watching grandson, lots of moving from from floor to stand, holding baby,ect.    Previous: Pt reported that her back has been good but most problematic has been right shoulder pain.  When reaching forward shoulder clicks and produces pain.  Seeking continued support for back pain and shoulder pain.               Session Information  Visit Type: Follow-up visit         Review of Systems         Provider reviewed plan for the acupuncture session, precautions and contraindications. Patient/guardian/hospital staff has given consent to treat with full understanding of what to expect during the session. Before acupuncture began, provider explained to the patient to communicate at any time if the procedure was causing discomfort past their tolerance level. Patient agreed to advise acupuncturist. The acupuncturist counseled the patient on the risks of acupuncture treatment including pain, infection, bleeding, and no relief of pain. The patient was positioned comfortably. There was no evidence of infection at the site of needle insertions.    Objective   Physical Exam                             Assessment/Plan

## 2025-07-17 ENCOUNTER — APPOINTMENT (OUTPATIENT)
Dept: INTEGRATIVE MEDICINE | Facility: CLINIC | Age: 75
End: 2025-07-17
Payer: MEDICARE

## 2025-07-17 DIAGNOSIS — M25.511 ACUTE PAIN OF RIGHT SHOULDER: ICD-10-CM

## 2025-07-17 DIAGNOSIS — M54.59 OTHER LOW BACK PAIN: Primary | ICD-10-CM

## 2025-07-17 PROCEDURE — 97810 ACUP 1/> WO ESTIM 1ST 15 MIN: CPT | Performed by: INTERNAL MEDICINE

## 2025-07-17 PROCEDURE — 97811 ACUP 1/> W/O ESTIM EA ADD 15: CPT | Performed by: INTERNAL MEDICINE

## 2025-07-17 NOTE — PROGRESS NOTES
Acupuncture Visit:     Subjective   Patient ID: Ludmila Bermudez is a 75 y.o. female who presents for No chief complaint on file.  Pt reported that right shoulder improved a little bit but last night had difficulty moving it.  She was able to have a few days of relief.  She has been moving furniture.  Lowback continues to feel good.  She likes putting heat on it when in truck.          Session Information  Is this acupuncture treatment being billed to the patient's insurance company: Yes  This is visit number: 8  The patient has a total number of visits of: 12  Initial Acupuncture Treatment date: 05/12/25  Last Treatment date: 08/10/25  Name of Insurance Company: Medicare a/b         Review of Systems         Provider reviewed plan for the acupuncture session, precautions and contraindications. Patient/guardian/hospital staff has given consent to treat with full understanding of what to expect during the session. Before acupuncture began, provider explained to the patient to communicate at any time if the procedure was causing discomfort past their tolerance level. Patient agreed to advise acupuncturist. The acupuncturist counseled the patient on the risks of acupuncture treatment including pain, infection, bleeding, and no relief of pain. The patient was positioned comfortably. There was no evidence of infection at the site of needle insertions.    Objective   Physical Exam         Treatment Plan  Treatment Goals: Pain management    Acupuncture Treatment  Patient Position: Lateral recumbent on a table  Needle Guage: 36 guage /.20/ Blue seirin, 40 guage /.16/ Red seirin  Body Points - Left: sp 6  Body Points - Bilateral: ub 22, 23, 24  Body Points - Right: si 9, 10, 11, 13, gb 21sj 14, li 15  Other Techniques Utilized: TDP Lamp, Topicals  Topicals Description: warming lotion  TDP Lamp Descripton: shoulder  Needle Count In: 14  Needle Count Out: 14  Needle Retention Time (min): 30 minutes  Total Face to Face Time  (min): 25 minutes              Assessment/Plan

## 2025-07-21 ENCOUNTER — APPOINTMENT (OUTPATIENT)
Dept: INTEGRATIVE MEDICINE | Facility: CLINIC | Age: 75
End: 2025-07-21
Payer: MEDICARE

## 2025-07-21 DIAGNOSIS — M54.59 OTHER LOW BACK PAIN: Primary | ICD-10-CM

## 2025-07-21 DIAGNOSIS — M54.2 NECK PAIN: ICD-10-CM

## 2025-07-21 DIAGNOSIS — M25.511 ACUTE PAIN OF RIGHT SHOULDER: ICD-10-CM

## 2025-07-21 PROCEDURE — 97811 ACUP 1/> W/O ESTIM EA ADD 15: CPT | Performed by: INTERNAL MEDICINE

## 2025-07-21 PROCEDURE — 97810 ACUP 1/> WO ESTIM 1ST 15 MIN: CPT | Performed by: INTERNAL MEDICINE

## 2025-07-21 NOTE — PROGRESS NOTES
Acupuncture Visit:     Subjective   Patient ID: Ludmila Bermudez is a 75 y.o. female who presents for No chief complaint on file.  Shoulder pain decrease with intermittent pain and improved at night.  She is taking less tylenol.      Her back continues to feel good.  Left side of neck has started to make clinking sound again.      Previous: Pt reported that right shoulder improved a little bit but last night had difficulty moving it.  She was able to have a few days of relief.  She has been moving furniture.  Lowback continues to feel good.  She likes putting heat on it when in truck.          Session Information  Is this acupuncture treatment being billed to the patient's insurance company: Yes  This is visit number: 9  The patient has a total number of visits of: 12  Initial Acupuncture Treatment date: 05/12/25  Last Treatment date: 08/10/25  Name of Insurance Company: Medicare a/b  Name of Supervising Physician: MATHEW Lundberg  Visit Type: Follow-up visit         Review of Systems         Provider reviewed plan for the acupuncture session, precautions and contraindications. Patient/guardian/hospital staff has given consent to treat with full understanding of what to expect during the session. Before acupuncture began, provider explained to the patient to communicate at any time if the procedure was causing discomfort past their tolerance level. Patient agreed to advise acupuncturist. The acupuncturist counseled the patient on the risks of acupuncture treatment including pain, infection, bleeding, and no relief of pain. The patient was positioned comfortably. There was no evidence of infection at the site of needle insertions.    Objective   Physical Exam         Treatment Plan  Treatment Goals: Pain management    Acupuncture Treatment  Patient Position: Supine on a table  Needle Guage: 40 guage /.16/ Red seirin, 36 guage /.20/ Blue seirin  Body Points - Bilateral: sp 6, li 4, gb 21, si 16,  Body Points - Right:  shoulder pt, ana luisa 3, li 15, sj 14  Other Techniques Utilized: TDP Lamp, Topicals  Topicals Description: warming lotion  TDP Lamp Descripton: shoulder  Needle Count In: 12  Needle Count Out: 12  Total Face to Face Time (min): 25 minutes              Assessment/Plan

## 2025-07-24 ENCOUNTER — APPOINTMENT (OUTPATIENT)
Dept: INTEGRATIVE MEDICINE | Facility: CLINIC | Age: 75
End: 2025-07-24
Payer: MEDICARE

## 2025-07-31 ENCOUNTER — APPOINTMENT (OUTPATIENT)
Dept: INTEGRATIVE MEDICINE | Facility: CLINIC | Age: 75
End: 2025-07-31
Payer: MEDICARE

## 2025-07-31 DIAGNOSIS — M54.59 OTHER LOW BACK PAIN: Primary | ICD-10-CM

## 2025-07-31 DIAGNOSIS — M54.2 NECK PAIN: ICD-10-CM

## 2025-07-31 PROCEDURE — 97810 ACUP 1/> WO ESTIM 1ST 15 MIN: CPT | Performed by: INTERNAL MEDICINE

## 2025-07-31 PROCEDURE — 97811 ACUP 1/> W/O ESTIM EA ADD 15: CPT | Performed by: INTERNAL MEDICINE

## 2025-07-31 NOTE — PROGRESS NOTES
Acupuncture Visit:     Subjective   Patient ID: Ludmila Bermudez is a 75 y.o. female who presents for No chief complaint on file.  Pt reported improvement in shoulder with intermittent clicking and minimal pain.  Yesterday her back felt dull and achy with left sided neck pain.  No cramping.         Session Information  Is this acupuncture treatment being billed to the patient's insurance company: Yes  This is visit number: 10  The patient has a total number of visits of: 12  Initial Acupuncture Treatment date: 05/12/25  Last Treatment date: 08/10/25  Name of Insurance Company: Medicare a/b  Name of Supervising Physician: MATHEW Lundberg  Visit Type: Follow-up visit         Review of Systems         Provider reviewed plan for the acupuncture session, precautions and contraindications. Patient/guardian/hospital staff has given consent to treat with full understanding of what to expect during the session. Before acupuncture began, provider explained to the patient to communicate at any time if the procedure was causing discomfort past their tolerance level. Patient agreed to advise acupuncturist. The acupuncturist counseled the patient on the risks of acupuncture treatment including pain, infection, bleeding, and no relief of pain. The patient was positioned comfortably. There was no evidence of infection at the site of needle insertions.    Objective   Physical Exam         Treatment Plan  Treatment Goals: Pain management    Acupuncture Treatment  Patient Position: Supine on a table  Needle Guage: 40 guage /.16/ Red seirin, 36 guage /.20/ Blue seirin  Body Points - Left: sj 5, gb 21, si 16, gb 20  Body Points - Bilateral: sp 6, st 36, 25,cv 12, 6,  Body Points - Right: gb 41, du 20  Other Techniques Utilized: TDP Lamp, Topicals  Topicals Description: warming lotion  TDP Lamp Descripton: romel  Needle Count In: 14  Needle Count Out: 14  Needle Retention Time (min): 30 minutes  Total Face to Face Time (min): 25 minutes               Assessment/Plan

## 2025-08-04 ENCOUNTER — APPOINTMENT (OUTPATIENT)
Dept: INTEGRATIVE MEDICINE | Facility: CLINIC | Age: 75
End: 2025-08-04
Payer: MEDICARE

## 2025-08-07 ENCOUNTER — APPOINTMENT (OUTPATIENT)
Dept: INTEGRATIVE MEDICINE | Facility: CLINIC | Age: 75
End: 2025-08-07
Payer: MEDICARE

## 2025-08-07 ENCOUNTER — ALLIED HEALTH (OUTPATIENT)
Dept: INTEGRATIVE MEDICINE | Facility: CLINIC | Age: 75
End: 2025-08-07
Payer: MEDICARE

## 2025-08-07 DIAGNOSIS — M25.511 ACUTE PAIN OF RIGHT SHOULDER: ICD-10-CM

## 2025-08-07 DIAGNOSIS — M54.59 OTHER LOW BACK PAIN: Primary | ICD-10-CM

## 2025-08-07 PROCEDURE — 97811 ACUP 1/> W/O ESTIM EA ADD 15: CPT | Performed by: INTERNAL MEDICINE

## 2025-08-07 PROCEDURE — 97810 ACUP 1/> WO ESTIM 1ST 15 MIN: CPT | Performed by: INTERNAL MEDICINE

## 2025-08-07 NOTE — PROGRESS NOTES
Acupuncture Visit:     Subjective   Patient ID: Ludmila Bermudez is a 75 y.o. female who presents for No chief complaint on file.  Pt with .  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  She notes improvement with right side shoulder and only intermittent discomfort in back.        Session Information  Is this acupuncture treatment being billed to the patient's insurance company: Yes  This is visit number: 11  The patient has a total number of visits of: 12  Initial Acupuncture Treatment date: 05/12/25  Name of Insurance Company: Medicare a/b  Name of Supervising Physician: MATHEW Lundberg  Visit Type: Follow-up visit         Review of Systems         Provider reviewed plan for the acupuncture session, precautions and contraindications. Patient/guardian/hospital staff has given consent to treat with full understanding of what to expect during the session. Before acupuncture began, provider explained to the patient to communicate at any time if the procedure was causing discomfort past their tolerance level. Patient agreed to advise acupuncturist. The acupuncturist counseled the patient on the risks of acupuncture treatment including pain, infection, bleeding, and no relief of pain. The patient was positioned comfortably. There was no evidence of infection at the site of needle insertions.    Objective   Physical Exam         Treatment Plan  Treatment Goals: Pain management    Acupuncture Treatment  Patient Position: Lateral recumbent on a table  Needle Guage: 40 guage /.16/ Red seirin, 32 guage /.25/ Purple seirin  Body Points - Left: k 7, 10  Body Points - Bilateral: ub 22, 23, 24  Body Points - Right: si 9, 10, gb21, li 5, 4, shoulder pt,  Other Techniques Utilized: TDP Lamp, Topicals  Topicals Description: warming lotion  TDP Lamp Descripton: victoriano men  Needle Count In: 14  Needle Count Out: 14  Needle Retention Time (min): 25 minutes  Total Face to Face Time (min): 25 minutes               Assessment/Plan

## 2025-09-15 ENCOUNTER — APPOINTMENT (OUTPATIENT)
Dept: INTEGRATIVE MEDICINE | Facility: CLINIC | Age: 75
End: 2025-09-15
Payer: MEDICARE

## 2025-09-22 ENCOUNTER — APPOINTMENT (OUTPATIENT)
Dept: INTEGRATIVE MEDICINE | Facility: CLINIC | Age: 75
End: 2025-09-22
Payer: MEDICARE

## 2025-10-13 ENCOUNTER — APPOINTMENT (OUTPATIENT)
Dept: INTEGRATIVE MEDICINE | Facility: CLINIC | Age: 75
End: 2025-10-13
Payer: MEDICARE

## 2025-10-16 ENCOUNTER — APPOINTMENT (OUTPATIENT)
Dept: INTEGRATIVE MEDICINE | Facility: CLINIC | Age: 75
End: 2025-10-16
Payer: MEDICARE

## 2025-11-13 ENCOUNTER — APPOINTMENT (OUTPATIENT)
Dept: INTEGRATIVE MEDICINE | Facility: CLINIC | Age: 75
End: 2025-11-13
Payer: MEDICARE

## 2025-12-15 ENCOUNTER — APPOINTMENT (OUTPATIENT)
Dept: INTEGRATIVE MEDICINE | Facility: CLINIC | Age: 75
End: 2025-12-15
Payer: MEDICARE